# Patient Record
Sex: FEMALE | Race: WHITE | Employment: OTHER | URBAN - METROPOLITAN AREA
[De-identification: names, ages, dates, MRNs, and addresses within clinical notes are randomized per-mention and may not be internally consistent; named-entity substitution may affect disease eponyms.]

---

## 2023-04-16 ENCOUNTER — APPOINTMENT (OUTPATIENT)
Dept: CT IMAGING | Age: 68
DRG: 375 | End: 2023-04-16
Payer: MEDICARE

## 2023-04-16 ENCOUNTER — HOSPITAL ENCOUNTER (INPATIENT)
Age: 68
LOS: 1 days | Discharge: HOME | DRG: 375 | End: 2023-04-18
Attending: FAMILY MEDICINE | Admitting: FAMILY MEDICINE
Payer: MEDICARE

## 2023-04-16 DIAGNOSIS — E11.65 TYPE 2 DIABETES MELLITUS WITH HYPERGLYCEMIA, WITHOUT LONG-TERM CURRENT USE OF INSULIN (HCC): ICD-10-CM

## 2023-04-16 DIAGNOSIS — C78.00 MALIGNANT NEOPLASM METASTATIC TO LUNG, UNSPECIFIED LATERALITY (HCC): ICD-10-CM

## 2023-04-16 DIAGNOSIS — C21.8 MALIGNANT NEOPLASM OF ANORECTUM (HCC): Primary | ICD-10-CM

## 2023-04-16 PROBLEM — R73.9 HYPERGLYCEMIA: Status: ACTIVE | Noted: 2023-04-16

## 2023-04-16 LAB
ALBUMIN SERPL-MCNC: 2.7 G/DL (ref 3.5–4.6)
ALP SERPL-CCNC: 176 U/L (ref 40–130)
ALT SERPL-CCNC: 13 U/L (ref 0–33)
ANION GAP SERPL CALCULATED.3IONS-SCNC: 13 MEQ/L (ref 9–15)
AST SERPL-CCNC: 21 U/L (ref 0–35)
BASOPHILS # BLD: 0 K/UL (ref 0–0.2)
BASOPHILS NFR BLD: 0.1 %
BILIRUB SERPL-MCNC: 0.4 MG/DL (ref 0.2–0.7)
BUN SERPL-MCNC: 7 MG/DL (ref 8–23)
CALCIUM SERPL-MCNC: 8.2 MG/DL (ref 8.5–9.9)
CHLORIDE SERPL-SCNC: 90 MEQ/L (ref 95–107)
CO2 SERPL-SCNC: 24 MEQ/L (ref 20–31)
CREAT SERPL-MCNC: 0.52 MG/DL (ref 0.5–0.9)
EOSINOPHIL # BLD: 0 K/UL (ref 0–0.7)
EOSINOPHIL NFR BLD: 0.1 %
ERYTHROCYTE [DISTWIDTH] IN BLOOD BY AUTOMATED COUNT: 14.9 % (ref 11.5–14.5)
GLOBULIN SER CALC-MCNC: 3.3 G/DL (ref 2.3–3.5)
GLUCOSE BLD-MCNC: 102 MG/DL (ref 70–99)
GLUCOSE SERPL-MCNC: 625 MG/DL (ref 70–99)
HBA1C MFR BLD: 14.3 % (ref 4.8–5.9)
HCT VFR BLD AUTO: 34.3 % (ref 37–47)
HGB BLD-MCNC: 11.2 G/DL (ref 12–16)
LACTATE BLDV-SCNC: 1.9 MMOL/L (ref 0.5–2.2)
LYMPHOCYTES # BLD: 2.7 K/UL (ref 1–4.8)
LYMPHOCYTES NFR BLD: 17 %
MAGNESIUM SERPL-MCNC: 1.8 MG/DL (ref 1.7–2.4)
MCH RBC QN AUTO: 29.1 PG (ref 27–31.3)
MCHC RBC AUTO-ENTMCNC: 32.6 % (ref 33–37)
MCV RBC AUTO: 89.1 FL (ref 79.4–94.8)
MONOCYTES # BLD: 0.9 K/UL (ref 0.2–0.8)
MONOCYTES NFR BLD: 5.9 %
NEUTROPHILS # BLD: 12.1 K/UL (ref 1.4–6.5)
NEUTS SEG NFR BLD: 76.9 %
PERFORMED ON: ABNORMAL
PLATELET # BLD AUTO: 488 K/UL (ref 130–400)
POTASSIUM SERPL-SCNC: 3.2 MEQ/L (ref 3.4–4.9)
PROT SERPL-MCNC: 6 G/DL (ref 6.3–8)
RBC # BLD AUTO: 3.85 M/UL (ref 4.2–5.4)
SODIUM SERPL-SCNC: 127 MEQ/L (ref 135–144)
WBC # BLD AUTO: 15.8 K/UL (ref 4.8–10.8)

## 2023-04-16 PROCEDURE — 2580000003 HC RX 258: Performed by: FAMILY MEDICINE

## 2023-04-16 PROCEDURE — 80053 COMPREHEN METABOLIC PANEL: CPT

## 2023-04-16 PROCEDURE — 6360000004 HC RX CONTRAST MEDICATION: Performed by: FAMILY MEDICINE

## 2023-04-16 PROCEDURE — G0378 HOSPITAL OBSERVATION PER HR: HCPCS

## 2023-04-16 PROCEDURE — 96361 HYDRATE IV INFUSION ADD-ON: CPT

## 2023-04-16 PROCEDURE — 99285 EMERGENCY DEPT VISIT HI MDM: CPT

## 2023-04-16 PROCEDURE — 86304 IMMUNOASSAY TUMOR CA 125: CPT

## 2023-04-16 PROCEDURE — 83036 HEMOGLOBIN GLYCOSYLATED A1C: CPT

## 2023-04-16 PROCEDURE — 87040 BLOOD CULTURE FOR BACTERIA: CPT

## 2023-04-16 PROCEDURE — 86301 IMMUNOASSAY TUMOR CA 19-9: CPT

## 2023-04-16 PROCEDURE — 83735 ASSAY OF MAGNESIUM: CPT

## 2023-04-16 PROCEDURE — 36415 COLL VENOUS BLD VENIPUNCTURE: CPT

## 2023-04-16 PROCEDURE — 85014 HEMATOCRIT: CPT

## 2023-04-16 PROCEDURE — 83605 ASSAY OF LACTIC ACID: CPT

## 2023-04-16 PROCEDURE — 85025 COMPLETE CBC W/AUTO DIFF WBC: CPT

## 2023-04-16 PROCEDURE — 82378 CARCINOEMBRYONIC ANTIGEN: CPT

## 2023-04-16 PROCEDURE — 74177 CT ABD & PELVIS W/CONTRAST: CPT

## 2023-04-16 PROCEDURE — 85018 HEMOGLOBIN: CPT

## 2023-04-16 PROCEDURE — 6370000000 HC RX 637 (ALT 250 FOR IP): Performed by: FAMILY MEDICINE

## 2023-04-16 RX ORDER — INSULIN LISPRO 100 [IU]/ML
0-4 INJECTION, SOLUTION INTRAVENOUS; SUBCUTANEOUS
Status: DISCONTINUED | OUTPATIENT
Start: 2023-04-17 | End: 2023-04-18 | Stop reason: HOSPADM

## 2023-04-16 RX ORDER — ONDANSETRON 4 MG/1
4 TABLET, ORALLY DISINTEGRATING ORAL EVERY 8 HOURS PRN
Status: DISCONTINUED | OUTPATIENT
Start: 2023-04-16 | End: 2023-04-18 | Stop reason: HOSPADM

## 2023-04-16 RX ORDER — 0.9 % SODIUM CHLORIDE 0.9 %
1000 INTRAVENOUS SOLUTION INTRAVENOUS ONCE
Status: COMPLETED | OUTPATIENT
Start: 2023-04-16 | End: 2023-04-16

## 2023-04-16 RX ORDER — ENOXAPARIN SODIUM 100 MG/ML
30 INJECTION SUBCUTANEOUS DAILY
Status: DISCONTINUED | OUTPATIENT
Start: 2023-04-16 | End: 2023-04-16

## 2023-04-16 RX ORDER — SODIUM CHLORIDE 0.9 % (FLUSH) 0.9 %
5-40 SYRINGE (ML) INJECTION PRN
Status: DISCONTINUED | OUTPATIENT
Start: 2023-04-16 | End: 2023-04-18 | Stop reason: HOSPADM

## 2023-04-16 RX ORDER — ACETAMINOPHEN 325 MG/1
650 TABLET ORAL EVERY 6 HOURS PRN
Status: DISCONTINUED | OUTPATIENT
Start: 2023-04-16 | End: 2023-04-18 | Stop reason: HOSPADM

## 2023-04-16 RX ORDER — POTASSIUM CHLORIDE 20 MEQ/1
40 TABLET, EXTENDED RELEASE ORAL ONCE
Status: COMPLETED | OUTPATIENT
Start: 2023-04-16 | End: 2023-04-17

## 2023-04-16 RX ORDER — INSULIN LISPRO 100 [IU]/ML
0-4 INJECTION, SOLUTION INTRAVENOUS; SUBCUTANEOUS NIGHTLY
Status: DISCONTINUED | OUTPATIENT
Start: 2023-04-16 | End: 2023-04-18 | Stop reason: HOSPADM

## 2023-04-16 RX ORDER — POLYETHYLENE GLYCOL 3350 17 G/17G
17 POWDER, FOR SOLUTION ORAL DAILY PRN
Status: DISCONTINUED | OUTPATIENT
Start: 2023-04-16 | End: 2023-04-18 | Stop reason: HOSPADM

## 2023-04-16 RX ORDER — ACETAMINOPHEN 650 MG/1
650 SUPPOSITORY RECTAL EVERY 6 HOURS PRN
Status: DISCONTINUED | OUTPATIENT
Start: 2023-04-16 | End: 2023-04-18 | Stop reason: HOSPADM

## 2023-04-16 RX ORDER — SODIUM CHLORIDE 0.9 % (FLUSH) 0.9 %
5-40 SYRINGE (ML) INJECTION EVERY 12 HOURS SCHEDULED
Status: DISCONTINUED | OUTPATIENT
Start: 2023-04-16 | End: 2023-04-18 | Stop reason: HOSPADM

## 2023-04-16 RX ORDER — INSULIN LISPRO 100 [IU]/ML
5 INJECTION, SOLUTION INTRAVENOUS; SUBCUTANEOUS
Status: DISCONTINUED | OUTPATIENT
Start: 2023-04-16 | End: 2023-04-18 | Stop reason: HOSPADM

## 2023-04-16 RX ORDER — ONDANSETRON 2 MG/ML
4 INJECTION INTRAMUSCULAR; INTRAVENOUS EVERY 6 HOURS PRN
Status: DISCONTINUED | OUTPATIENT
Start: 2023-04-16 | End: 2023-04-18 | Stop reason: HOSPADM

## 2023-04-16 RX ORDER — SODIUM CHLORIDE 9 MG/ML
INJECTION, SOLUTION INTRAVENOUS PRN
Status: DISCONTINUED | OUTPATIENT
Start: 2023-04-16 | End: 2023-04-18 | Stop reason: HOSPADM

## 2023-04-16 RX ORDER — DEXTROSE MONOHYDRATE 100 MG/ML
INJECTION, SOLUTION INTRAVENOUS CONTINUOUS PRN
Status: DISCONTINUED | OUTPATIENT
Start: 2023-04-16 | End: 2023-04-18 | Stop reason: HOSPADM

## 2023-04-16 RX ORDER — SODIUM CHLORIDE AND POTASSIUM CHLORIDE 300; 900 MG/100ML; MG/100ML
INJECTION, SOLUTION INTRAVENOUS CONTINUOUS
Status: DISCONTINUED | OUTPATIENT
Start: 2023-04-16 | End: 2023-04-18 | Stop reason: HOSPADM

## 2023-04-16 RX ORDER — INSULIN GLARGINE 100 [IU]/ML
0.25 INJECTION, SOLUTION SUBCUTANEOUS NIGHTLY
Status: DISCONTINUED | OUTPATIENT
Start: 2023-04-16 | End: 2023-04-18 | Stop reason: HOSPADM

## 2023-04-16 RX ADMIN — SODIUM CHLORIDE 1000 ML: 9 INJECTION, SOLUTION INTRAVENOUS at 18:57

## 2023-04-16 RX ADMIN — SODIUM CHLORIDE 1000 ML: 9 INJECTION, SOLUTION INTRAVENOUS at 16:18

## 2023-04-16 RX ADMIN — IOPAMIDOL 50 ML: 612 INJECTION, SOLUTION INTRAVENOUS at 16:50

## 2023-04-16 RX ADMIN — INSULIN HUMAN 8 UNITS: 100 INJECTION, SOLUTION PARENTERAL at 18:55

## 2023-04-16 ASSESSMENT — ENCOUNTER SYMPTOMS
RESPIRATORY NEGATIVE: 1
ANAL BLEEDING: 1

## 2023-04-16 ASSESSMENT — LIFESTYLE VARIABLES
HOW MANY STANDARD DRINKS CONTAINING ALCOHOL DO YOU HAVE ON A TYPICAL DAY: PATIENT DOES NOT DRINK
HOW OFTEN DO YOU HAVE A DRINK CONTAINING ALCOHOL: NEVER

## 2023-04-16 ASSESSMENT — PAIN DESCRIPTION - PAIN TYPE: TYPE: ACUTE PAIN

## 2023-04-16 ASSESSMENT — PAIN DESCRIPTION - LOCATION: LOCATION: RECTUM

## 2023-04-16 ASSESSMENT — PAIN - FUNCTIONAL ASSESSMENT: PAIN_FUNCTIONAL_ASSESSMENT: 0-10

## 2023-04-16 ASSESSMENT — PAIN DESCRIPTION - DESCRIPTORS: DESCRIPTORS: PRESSURE

## 2023-04-16 ASSESSMENT — PAIN SCALES - GENERAL: PAINLEVEL_OUTOF10: 3

## 2023-04-17 ENCOUNTER — ANESTHESIA EVENT (OUTPATIENT)
Dept: OPERATING ROOM | Age: 68
DRG: 375 | End: 2023-04-17
Payer: MEDICARE

## 2023-04-17 ENCOUNTER — ANESTHESIA (OUTPATIENT)
Dept: OPERATING ROOM | Age: 68
DRG: 375 | End: 2023-04-17
Payer: MEDICARE

## 2023-04-17 PROBLEM — Z71.89 ENCOUNTER FOR HOSPICE CARE DISCUSSION: Status: ACTIVE | Noted: 2023-04-17

## 2023-04-17 PROBLEM — Z71.89 GOALS OF CARE, COUNSELING/DISCUSSION: Status: ACTIVE | Noted: 2023-04-17

## 2023-04-17 PROBLEM — Z71.89 ADVANCED CARE PLANNING/COUNSELING DISCUSSION: Status: ACTIVE | Noted: 2023-04-17

## 2023-04-17 PROBLEM — C18.9 COLON CANCER METASTASIZED TO MULTIPLE SITES (HCC): Status: ACTIVE | Noted: 2023-04-17

## 2023-04-17 PROBLEM — C21.8 MALIGNANT NEOPLASM OF ANORECTUM (HCC): Status: ACTIVE | Noted: 2023-04-17

## 2023-04-17 PROBLEM — Z51.5 PALLIATIVE CARE ENCOUNTER: Status: ACTIVE | Noted: 2023-04-17

## 2023-04-17 LAB
ALBUMIN SERPL-MCNC: 2.5 G/DL (ref 3.5–4.6)
ALP SERPL-CCNC: 154 U/L (ref 40–130)
ALT SERPL-CCNC: 12 U/L (ref 0–33)
ANION GAP SERPL CALCULATED.3IONS-SCNC: 11 MEQ/L (ref 9–15)
ANION GAP SERPL CALCULATED.3IONS-SCNC: 14 MEQ/L (ref 9–15)
APTT PPP: 32.3 SEC (ref 24.4–36.8)
AST SERPL-CCNC: 23 U/L (ref 0–35)
BASOPHILS # BLD: 0 K/UL (ref 0–0.2)
BASOPHILS NFR BLD: 0.2 %
BILIRUB DIRECT SERPL-MCNC: <0.2 MG/DL (ref 0–0.4)
BILIRUB INDIRECT SERPL-MCNC: ABNORMAL MG/DL (ref 0–0.6)
BILIRUB SERPL-MCNC: 0.4 MG/DL (ref 0.2–0.7)
BUN SERPL-MCNC: 4 MG/DL (ref 8–23)
BUN SERPL-MCNC: 4 MG/DL (ref 8–23)
CA 125: 45 U/ML
CALCIUM SERPL-MCNC: 7.9 MG/DL (ref 8.5–9.9)
CALCIUM SERPL-MCNC: 8.3 MG/DL (ref 8.5–9.9)
CANCER AG19-9 SERPL IA-ACNC: 528 U/ML (ref 0–35)
CARCINOEMBRYONIC ANTIGEN: 267.4 NG/ML
CHLORIDE SERPL-SCNC: 99 MEQ/L (ref 95–107)
CHLORIDE SERPL-SCNC: 99 MEQ/L (ref 95–107)
CO2 SERPL-SCNC: 21 MEQ/L (ref 20–31)
CO2 SERPL-SCNC: 22 MEQ/L (ref 20–31)
CREAT SERPL-MCNC: 0.37 MG/DL (ref 0.5–0.9)
CREAT SERPL-MCNC: 0.42 MG/DL (ref 0.5–0.9)
EKG ATRIAL RATE: 89 BPM
EKG P AXIS: 56 DEGREES
EKG P-R INTERVAL: 126 MS
EKG Q-T INTERVAL: 406 MS
EKG QRS DURATION: 58 MS
EKG QTC CALCULATION (BAZETT): 493 MS
EKG R AXIS: 49 DEGREES
EKG T AXIS: 38 DEGREES
EKG VENTRICULAR RATE: 89 BPM
EOSINOPHIL # BLD: 0 K/UL (ref 0–0.7)
EOSINOPHIL NFR BLD: 0.2 %
ERYTHROCYTE [DISTWIDTH] IN BLOOD BY AUTOMATED COUNT: 14.6 % (ref 11.5–14.5)
GLUCOSE BLD-MCNC: 190 MG/DL (ref 70–99)
GLUCOSE BLD-MCNC: 230 MG/DL (ref 70–99)
GLUCOSE BLD-MCNC: 243 MG/DL (ref 70–99)
GLUCOSE BLD-MCNC: 250 MG/DL (ref 70–99)
GLUCOSE BLD-MCNC: 419 MG/DL (ref 70–99)
GLUCOSE SERPL-MCNC: 110 MG/DL (ref 70–99)
GLUCOSE SERPL-MCNC: 189 MG/DL (ref 70–99)
HCT VFR BLD AUTO: 30.6 % (ref 37–47)
HCT VFR BLD AUTO: 31.5 % (ref 37–47)
HCT VFR BLD AUTO: 34 % (ref 37–47)
HCT VFR BLD AUTO: 34 % (ref 37–47)
HGB BLD-MCNC: 10.3 G/DL (ref 12–16)
HGB BLD-MCNC: 10.3 G/DL (ref 12–16)
HGB BLD-MCNC: 11 G/DL (ref 12–16)
HGB BLD-MCNC: 11.2 G/DL (ref 12–16)
INR PPP: 1.1
LYMPHOCYTES # BLD: 3.4 K/UL (ref 1–4.8)
LYMPHOCYTES NFR BLD: 19 %
MCH RBC QN AUTO: 29.4 PG (ref 27–31.3)
MCHC RBC AUTO-ENTMCNC: 33 % (ref 33–37)
MCV RBC AUTO: 89.3 FL (ref 79.4–94.8)
MONOCYTES # BLD: 1.1 K/UL (ref 0.2–0.8)
MONOCYTES NFR BLD: 5.9 %
NEUTROPHILS # BLD: 13.3 K/UL (ref 1.4–6.5)
NEUTS SEG NFR BLD: 74.7 %
PERFORMED ON: ABNORMAL
PLATELET # BLD AUTO: 484 K/UL (ref 130–400)
POC CREATININE: 0.4 MG/DL (ref 0.6–1.2)
POC SAMPLE TYPE: ABNORMAL
POTASSIUM SERPL-SCNC: 2.6 MEQ/L (ref 3.4–4.9)
POTASSIUM SERPL-SCNC: 3.8 MEQ/L (ref 3.4–4.9)
PROT SERPL-MCNC: 5.9 G/DL (ref 6.3–8)
PROTHROMBIN TIME: 14.4 SEC (ref 12.3–14.9)
RBC # BLD AUTO: 3.81 M/UL (ref 4.2–5.4)
SODIUM SERPL-SCNC: 132 MEQ/L (ref 135–144)
SODIUM SERPL-SCNC: 134 MEQ/L (ref 135–144)
WBC # BLD AUTO: 17.8 K/UL (ref 4.8–10.8)

## 2023-04-17 PROCEDURE — 94664 DEMO&/EVAL PT USE INHALER: CPT

## 2023-04-17 PROCEDURE — 85018 HEMOGLOBIN: CPT

## 2023-04-17 PROCEDURE — 2709999900 HC NON-CHARGEABLE SUPPLY: Performed by: COLON & RECTAL SURGERY

## 2023-04-17 PROCEDURE — 99223 1ST HOSP IP/OBS HIGH 75: CPT | Performed by: NURSE PRACTITIONER

## 2023-04-17 PROCEDURE — 94640 AIRWAY INHALATION TREATMENT: CPT

## 2023-04-17 PROCEDURE — 6360000002 HC RX W HCPCS: Performed by: INTERNAL MEDICINE

## 2023-04-17 PROCEDURE — 36415 COLL VENOUS BLD VENIPUNCTURE: CPT

## 2023-04-17 PROCEDURE — 80076 HEPATIC FUNCTION PANEL: CPT

## 2023-04-17 PROCEDURE — 7100000001 HC PACU RECOVERY - ADDTL 15 MIN: Performed by: COLON & RECTAL SURGERY

## 2023-04-17 PROCEDURE — 96365 THER/PROPH/DIAG IV INF INIT: CPT

## 2023-04-17 PROCEDURE — 1210000000 HC MED SURG R&B

## 2023-04-17 PROCEDURE — 80048 BASIC METABOLIC PNL TOTAL CA: CPT

## 2023-04-17 PROCEDURE — 2580000003 HC RX 258: Performed by: COLON & RECTAL SURGERY

## 2023-04-17 PROCEDURE — 7100000000 HC PACU RECOVERY - FIRST 15 MIN: Performed by: COLON & RECTAL SURGERY

## 2023-04-17 PROCEDURE — 88342 IMHCHEM/IMCYTCHM 1ST ANTB: CPT

## 2023-04-17 PROCEDURE — 6370000000 HC RX 637 (ALT 250 FOR IP): Performed by: COLON & RECTAL SURGERY

## 2023-04-17 PROCEDURE — 6370000000 HC RX 637 (ALT 250 FOR IP): Performed by: INTERNAL MEDICINE

## 2023-04-17 PROCEDURE — 99221 1ST HOSP IP/OBS SF/LOW 40: CPT | Performed by: COLON & RECTAL SURGERY

## 2023-04-17 PROCEDURE — 3700000000 HC ANESTHESIA ATTENDED CARE: Performed by: COLON & RECTAL SURGERY

## 2023-04-17 PROCEDURE — 88305 TISSUE EXAM BY PATHOLOGIST: CPT

## 2023-04-17 PROCEDURE — 3700000001 HC ADD 15 MINUTES (ANESTHESIA): Performed by: COLON & RECTAL SURGERY

## 2023-04-17 PROCEDURE — G0378 HOSPITAL OBSERVATION PER HR: HCPCS

## 2023-04-17 PROCEDURE — 85025 COMPLETE CBC W/AUTO DIFF WBC: CPT

## 2023-04-17 PROCEDURE — 85610 PROTHROMBIN TIME: CPT

## 2023-04-17 PROCEDURE — 85014 HEMATOCRIT: CPT

## 2023-04-17 PROCEDURE — 88341 IMHCHEM/IMCYTCHM EA ADD ANTB: CPT

## 2023-04-17 PROCEDURE — 94761 N-INVAS EAR/PLS OXIMETRY MLT: CPT

## 2023-04-17 PROCEDURE — 96366 THER/PROPH/DIAG IV INF ADDON: CPT

## 2023-04-17 PROCEDURE — 88331 PATH CONSLTJ SURG 1 BLK 1SPC: CPT

## 2023-04-17 PROCEDURE — 45338 SIGMOIDOSCOPY W/TUMR REMOVE: CPT | Performed by: COLON & RECTAL SURGERY

## 2023-04-17 PROCEDURE — 85730 THROMBOPLASTIN TIME PARTIAL: CPT

## 2023-04-17 PROCEDURE — 93005 ELECTROCARDIOGRAM TRACING: CPT | Performed by: INTERNAL MEDICINE

## 2023-04-17 PROCEDURE — 0DBP8ZX EXCISION OF RECTUM, VIA NATURAL OR ARTIFICIAL OPENING ENDOSCOPIC, DIAGNOSTIC: ICD-10-PCS | Performed by: COLON & RECTAL SURGERY

## 2023-04-17 PROCEDURE — 3609008400 HC SIGMOIDOSCOPY DIAGNOSTIC: Performed by: COLON & RECTAL SURGERY

## 2023-04-17 RX ORDER — MEPERIDINE HYDROCHLORIDE 25 MG/ML
12.5 INJECTION INTRAMUSCULAR; INTRAVENOUS; SUBCUTANEOUS
Status: DISCONTINUED | OUTPATIENT
Start: 2023-04-17 | End: 2023-04-17 | Stop reason: HOSPADM

## 2023-04-17 RX ORDER — ONDANSETRON 2 MG/ML
4 INJECTION INTRAMUSCULAR; INTRAVENOUS
Status: DISCONTINUED | OUTPATIENT
Start: 2023-04-17 | End: 2023-04-17 | Stop reason: HOSPADM

## 2023-04-17 RX ORDER — SODIUM CHLORIDE 0.9 % (FLUSH) 0.9 %
5-40 SYRINGE (ML) INJECTION PRN
Status: DISCONTINUED | OUTPATIENT
Start: 2023-04-17 | End: 2023-04-17 | Stop reason: HOSPADM

## 2023-04-17 RX ORDER — METOCLOPRAMIDE HYDROCHLORIDE 5 MG/ML
10 INJECTION INTRAMUSCULAR; INTRAVENOUS
Status: DISCONTINUED | OUTPATIENT
Start: 2023-04-17 | End: 2023-04-17 | Stop reason: HOSPADM

## 2023-04-17 RX ORDER — SODIUM CHLORIDE 9 MG/ML
25 INJECTION, SOLUTION INTRAVENOUS PRN
Status: DISCONTINUED | OUTPATIENT
Start: 2023-04-17 | End: 2023-04-17 | Stop reason: HOSPADM

## 2023-04-17 RX ORDER — SODIUM CHLORIDE, SODIUM LACTATE, POTASSIUM CHLORIDE, CALCIUM CHLORIDE 600; 310; 30; 20 MG/100ML; MG/100ML; MG/100ML; MG/100ML
INJECTION, SOLUTION INTRAVENOUS CONTINUOUS
Status: DISCONTINUED | OUTPATIENT
Start: 2023-04-17 | End: 2023-04-17 | Stop reason: HOSPADM

## 2023-04-17 RX ORDER — DIPHENHYDRAMINE HYDROCHLORIDE 50 MG/ML
12.5 INJECTION INTRAMUSCULAR; INTRAVENOUS
Status: DISCONTINUED | OUTPATIENT
Start: 2023-04-17 | End: 2023-04-17 | Stop reason: HOSPADM

## 2023-04-17 RX ORDER — PROPOFOL 10 MG/ML
INJECTION, EMULSION INTRAVENOUS CONTINUOUS PRN
Status: DISCONTINUED | OUTPATIENT
Start: 2023-04-17 | End: 2023-04-17 | Stop reason: SDUPTHER

## 2023-04-17 RX ORDER — FENTANYL CITRATE 0.05 MG/ML
50 INJECTION, SOLUTION INTRAMUSCULAR; INTRAVENOUS EVERY 10 MIN PRN
Status: DISCONTINUED | OUTPATIENT
Start: 2023-04-17 | End: 2023-04-17 | Stop reason: HOSPADM

## 2023-04-17 RX ORDER — ALBUTEROL SULFATE 2.5 MG/3ML
2.5 SOLUTION RESPIRATORY (INHALATION) EVERY 4 HOURS PRN
Status: DISCONTINUED | OUTPATIENT
Start: 2023-04-17 | End: 2023-04-18 | Stop reason: HOSPADM

## 2023-04-17 RX ORDER — SODIUM CHLORIDE 0.9 % (FLUSH) 0.9 %
5-40 SYRINGE (ML) INJECTION EVERY 12 HOURS SCHEDULED
Status: DISCONTINUED | OUTPATIENT
Start: 2023-04-17 | End: 2023-04-17 | Stop reason: HOSPADM

## 2023-04-17 RX ORDER — OXYCODONE HYDROCHLORIDE 5 MG/1
5 TABLET ORAL
Status: DISCONTINUED | OUTPATIENT
Start: 2023-04-17 | End: 2023-04-17 | Stop reason: HOSPADM

## 2023-04-17 RX ADMIN — INSULIN HUMAN 4 UNITS: 100 INJECTION, SOLUTION PARENTERAL at 23:40

## 2023-04-17 RX ADMIN — ALBUTEROL SULFATE 2.5 MG: 2.5 SOLUTION RESPIRATORY (INHALATION) at 06:05

## 2023-04-17 RX ADMIN — Medication 10 ML: at 23:24

## 2023-04-17 RX ADMIN — SODIUM CHLORIDE, POTASSIUM CHLORIDE, SODIUM LACTATE AND CALCIUM CHLORIDE: 600; 310; 30; 20 INJECTION, SOLUTION INTRAVENOUS at 09:11

## 2023-04-17 RX ADMIN — POTASSIUM CHLORIDE AND SODIUM CHLORIDE: 900; 300 INJECTION, SOLUTION INTRAVENOUS at 01:12

## 2023-04-17 RX ADMIN — POTASSIUM CHLORIDE 40 MEQ: 1500 TABLET, EXTENDED RELEASE ORAL at 01:06

## 2023-04-17 RX ADMIN — PROPOFOL 120 MCG/KG/MIN: 10 INJECTION, EMULSION INTRAVENOUS at 10:17

## 2023-04-17 RX ADMIN — INSULIN LISPRO 4 UNITS: 100 INJECTION, SOLUTION INTRAVENOUS; SUBCUTANEOUS at 21:24

## 2023-04-17 ASSESSMENT — ENCOUNTER SYMPTOMS
CONSTIPATION: 0
SHORTNESS OF BREATH: 0
NAUSEA: 0
COUGH: 1
DIARRHEA: 0
ABDOMINAL PAIN: 0
SINUS PAIN: 0
SINUS PRESSURE: 0
BLOOD IN STOOL: 1
BACK PAIN: 0
TROUBLE SWALLOWING: 0

## 2023-04-17 ASSESSMENT — PAIN SCALES - GENERAL
PAINLEVEL_OUTOF10: 0

## 2023-04-17 ASSESSMENT — PAIN DESCRIPTION - LOCATION
LOCATION: RECTUM

## 2023-04-17 ASSESSMENT — PAIN DESCRIPTION - PAIN TYPE
TYPE: SURGICAL PAIN

## 2023-04-17 ASSESSMENT — PAIN DESCRIPTION - DESCRIPTORS
DESCRIPTORS: PRESSURE

## 2023-04-17 NOTE — ANESTHESIA POSTPROCEDURE EVALUATION
Department of Anesthesiology  Postprocedure Note    Patient: Wendy Coleman  MRN: [id number]  YOB: 1955  Date of evaluation: 4/17/2023      Procedure Summary     Date: 04/17/23 Room / Location: [address]    Anesthesia Start: 1015 Anesthesia Stop:     Procedure: Otila Dace (Rectum) Diagnosis:       Malignant neoplasm of anorectum ([address]      (Malignant neoplasm of anorectum ([address] [C21.8])    Surgeons: Kristin Underwood MD Responsible Provider: Seda Huddleston MD    Anesthesia Type: MAC ASA Status: 3          Anesthesia Type: No value filed.     Mu Phase I:      Mu Phase II:        Anesthesia Post Evaluation    Patient location during evaluation: PACU  Patient participation: complete - patient participated  Level of consciousness: awake  Pain score: 0  Airway patency: patent  Nausea & Vomiting: no nausea  Complications: no  Cardiovascular status: hemodynamically stable  Respiratory status: face mask  Hydration status: euvolemic

## 2023-04-17 NOTE — H&P
Hospital Medicine  History and Physical    Patient:  Pratima Campos  MRN: [id number]    CHIEF COMPLAINT:    Chief Complaint   Patient presents with    Rectal Bleeding     Off and on for months, started again this morning       History Obtained From:  patient, electronic medical record  Primary Care Physician: Natalio Georges DO    HISTORY OF PRESENT ILLNESS:   The patient is a 75 y.o. female who presents with bright red blood per rectum. Patient is a 68-year-old female who noted some blood with bowel movements for the past several months. She came to the hospital for evaluation was found to have a large rectal mass. 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 Patient also has a fluid density in the pancreatic tail her body likely representing IPMN. Patient admits to occasional fevers no chest pain or palpitation she has an occasional cough nonproductive she has poor appetite and has had a significant amount of weight loss recently. She had no previous history of diabetes but presents with a glucose of 625 Chloride are 127 3.2 respectivelybut creatinine noted to be 0.5. Patient has been up-to-date on all her cancer screenings and has had no abnormalities she has had no abnormal Pap smears colonoscopies EGD or mammograms    Past Medical History:      Diagnosis Date    Asthma        Past Surgical History:      Procedure Laterality Date     SECTION      DILATION AND CURETTAGE OF UTERUS         Medications Prior to Admission:    Prior to Admission medications    Medication Sig Start Date End Date Taking? Authorizing Provider   ALBUTEROL IN Inhale 2 puffs into the lungs as needed   Yes Historical Provider, MD       Allergies:  Sulfa antibiotics    Social History:   TOBACCO:   reports that she has never smoked.  She has never used

## 2023-04-17 NOTE — ANESTHESIA PRE PROCEDURE
Department of Anesthesiology  Preprocedure Note       Name:  Shawn Nichols   Age:  75 y.o.  :  1955                                          MRN:  [id number]         Date:  2023      Surgeon: Mary Aly):  Stefanie Llanes MD    Procedure: Procedure(s): FLEXIBLE SIGMOIDOSCOPY ROOM 183    Medications prior to admission:   Prior to Admission medications    Medication Sig Start Date End Date Taking?  Authorizing Provider   ALBUTEROL IN Inhale 2 puffs into the lungs as needed   Yes Historical Provider, MD       Current medications:    Current Facility-Administered Medications   Medication Dose Route Frequency Provider Last Rate Last Admin    albuterol (PROVENTIL) nebulizer solution 2.5 mg  2.5 mg Nebulization Q4H PRN Raynold Wellington Sedar, DO   2.5 mg at 23 0605    sodium chloride flush 0.9 % injection 5-40 mL  5-40 mL IntraVENous 2 times per day Raynold Maribel Sedar, DO        sodium chloride flush 0.9 % injection 5-40 mL  5-40 mL IntraVENous PRN Raynold Wellington Sedar, DO        0.9 % sodium chloride infusion   IntraVENous PRN Raynold Maribel Sedar, DO        ondansetron (ZOFRAN-ODT) disintegrating tablet 4 mg  4 mg Oral Q8H PRN Raynold Maribel Sedar, DO        Or    ondansetron (ZOFRAN) injection 4 mg  4 mg IntraVENous Q6H PRN Raynold Wellington Sedar, DO        polyethylene glycol (GLYCOLAX) packet 17 g  17 g Oral Daily PRN Raynold Maribel Sedar, DO        acetaminophen (TYLENOL) tablet 650 mg  650 mg Oral Q6H PRN Raynold Wellington Sedar, DO        Or    acetaminophen (TYLENOL) suppository 650 mg  650 mg Rectal Q6H PRN Raynold Wellington Sedar, DO        glucose chewable tablet 16 g  4 tablet Oral PRN Raynold Maribel Sedar, DO        dextrose bolus 10% 125 mL  125 mL IntraVENous PRN Raynold Wellington Sedar, DO        Or    dextrose bolus 10% 250 mL  250 mL IntraVENous PRN Raynold Maribel Sedar, DO        glucagon (rDNA) injection 1 mg  1 mg SubCUTAneous PRN Raynold Maribel Sedar, DO        dextrose 10 % infusion   IntraVENous Continuous PRN Raynold Wellington Sedar, DO        [Held by provider] insulin glargine

## 2023-04-17 NOTE — FLOWSHEET NOTE
Upon arrival to shift, surgery called. Patient to go for flexible sigmoidoscopy for biopsy. Patient agreed. Patient left by 0830. Patient back to unit by 1200. Patient tolerated well. Dry dressing on biopsy site on external buttock. Pall care met with patient. Patient needs more time. Dr Jesusita Meneses spoke to patient. Dr Tate Ruiz will talk to patient tomorrow about possible surgical options. Family visited  Patient having mucous like bloody stool output. Consisent with like petroleum jelly mixed with vangie blood and stool. Small amount. Changed dry dressing on external mass. Dr Escalona  rounded. Reports to nurse. Confirmed mass is cancer. States no surgical options at this time.

## 2023-04-18 VITALS
BODY MASS INDEX: 18.35 KG/M2 | SYSTOLIC BLOOD PRESSURE: 137 MMHG | RESPIRATION RATE: 18 BRPM | TEMPERATURE: 97.3 F | HEIGHT: 59 IN | WEIGHT: 91 LBS | OXYGEN SATURATION: 98 % | DIASTOLIC BLOOD PRESSURE: 70 MMHG | HEART RATE: 96 BPM

## 2023-04-18 LAB
ALBUMIN SERPL-MCNC: 2.4 G/DL (ref 3.5–4.6)
ALP SERPL-CCNC: 134 U/L (ref 40–130)
ALT SERPL-CCNC: 14 U/L (ref 0–33)
ANION GAP SERPL CALCULATED.3IONS-SCNC: 10 MEQ/L (ref 9–15)
AST SERPL-CCNC: 24 U/L (ref 0–35)
BASOPHILS # BLD: 0 K/UL (ref 0–0.2)
BASOPHILS NFR BLD: 0.3 %
BILIRUB DIRECT SERPL-MCNC: <0.2 MG/DL (ref 0–0.4)
BILIRUB INDIRECT SERPL-MCNC: ABNORMAL MG/DL (ref 0–0.6)
BILIRUB SERPL-MCNC: 0.4 MG/DL (ref 0.2–0.7)
BUN SERPL-MCNC: 5 MG/DL (ref 8–23)
CALCIUM SERPL-MCNC: 8.2 MG/DL (ref 8.5–9.9)
CHLORIDE SERPL-SCNC: 100 MEQ/L (ref 95–107)
CO2 SERPL-SCNC: 26 MEQ/L (ref 20–31)
CREAT SERPL-MCNC: 0.41 MG/DL (ref 0.5–0.9)
EOSINOPHIL # BLD: 0.1 K/UL (ref 0–0.7)
EOSINOPHIL NFR BLD: 0.5 %
ERYTHROCYTE [DISTWIDTH] IN BLOOD BY AUTOMATED COUNT: 14.7 % (ref 11.5–14.5)
GLUCOSE BLD-MCNC: 186 MG/DL (ref 70–99)
GLUCOSE BLD-MCNC: 307 MG/DL (ref 70–99)
GLUCOSE BLD-MCNC: 441 MG/DL (ref 70–99)
GLUCOSE SERPL-MCNC: 166 MG/DL (ref 70–99)
HCT VFR BLD AUTO: 31.3 % (ref 37–47)
HCT VFR BLD AUTO: 31.6 % (ref 37–47)
HCT VFR BLD AUTO: 31.9 % (ref 37–47)
HCT VFR BLD AUTO: 34.1 % (ref 37–47)
HCT VFR BLD AUTO: 37.2 % (ref 37–47)
HGB BLD-MCNC: 10.4 G/DL (ref 12–16)
HGB BLD-MCNC: 10.5 G/DL (ref 12–16)
HGB BLD-MCNC: 10.5 G/DL (ref 12–16)
HGB BLD-MCNC: 11.3 G/DL (ref 12–16)
HGB BLD-MCNC: 12 G/DL (ref 12–16)
LYMPHOCYTES # BLD: 3.3 K/UL (ref 1–4.8)
LYMPHOCYTES NFR BLD: 20.7 %
MAGNESIUM SERPL-MCNC: 1.7 MG/DL (ref 1.7–2.4)
MCH RBC QN AUTO: 29.4 PG (ref 27–31.3)
MCHC RBC AUTO-ENTMCNC: 33.6 % (ref 33–37)
MCV RBC AUTO: 87.5 FL (ref 79.4–94.8)
MONOCYTES # BLD: 1 K/UL (ref 0.2–0.8)
MONOCYTES NFR BLD: 6.4 %
NEUTROPHILS # BLD: 11.5 K/UL (ref 1.4–6.5)
NEUTS SEG NFR BLD: 72.1 %
PERFORMED ON: ABNORMAL
PLATELET # BLD AUTO: 476 K/UL (ref 130–400)
POTASSIUM SERPL-SCNC: 3.5 MEQ/L (ref 3.4–4.9)
PROT SERPL-MCNC: 5.1 G/DL (ref 6.3–8)
RBC # BLD AUTO: 3.57 M/UL (ref 4.2–5.4)
SODIUM SERPL-SCNC: 136 MEQ/L (ref 135–144)
WBC # BLD AUTO: 16 K/UL (ref 4.8–10.8)

## 2023-04-18 PROCEDURE — 6370000000 HC RX 637 (ALT 250 FOR IP): Performed by: COLON & RECTAL SURGERY

## 2023-04-18 PROCEDURE — 85025 COMPLETE CBC W/AUTO DIFF WBC: CPT

## 2023-04-18 PROCEDURE — 80048 BASIC METABOLIC PNL TOTAL CA: CPT

## 2023-04-18 PROCEDURE — 80076 HEPATIC FUNCTION PANEL: CPT

## 2023-04-18 PROCEDURE — 36415 COLL VENOUS BLD VENIPUNCTURE: CPT

## 2023-04-18 PROCEDURE — 82105 ALPHA-FETOPROTEIN SERUM: CPT

## 2023-04-18 PROCEDURE — 99233 SBSQ HOSP IP/OBS HIGH 50: CPT | Performed by: NURSE PRACTITIONER

## 2023-04-18 PROCEDURE — 85014 HEMATOCRIT: CPT

## 2023-04-18 PROCEDURE — 85018 HEMOGLOBIN: CPT

## 2023-04-18 PROCEDURE — 83735 ASSAY OF MAGNESIUM: CPT

## 2023-04-18 RX ADMIN — INSULIN LISPRO 4 UNITS: 100 INJECTION, SOLUTION INTRAVENOUS; SUBCUTANEOUS at 11:34

## 2023-04-18 ASSESSMENT — PAIN DESCRIPTION - PAIN TYPE
TYPE: ACUTE PAIN;CHRONIC PAIN

## 2023-04-18 ASSESSMENT — PAIN DESCRIPTION - LOCATION
LOCATION: RECTUM

## 2023-04-18 ASSESSMENT — ENCOUNTER SYMPTOMS
SINUS PAIN: 0
TROUBLE SWALLOWING: 0
DIARRHEA: 0
SHORTNESS OF BREATH: 0
COUGH: 1
NAUSEA: 0
ABDOMINAL PAIN: 0
BACK PAIN: 0
CONSTIPATION: 0
SINUS PRESSURE: 0
BLOOD IN STOOL: 1
RECTAL PAIN: 1

## 2023-04-18 ASSESSMENT — PAIN DESCRIPTION - DESCRIPTORS
DESCRIPTORS: PRESSURE

## 2023-04-18 ASSESSMENT — PAIN SCALES - GENERAL
PAINLEVEL_OUTOF10: 3
PAINLEVEL_OUTOF10: 3
PAINLEVEL_OUTOF10: 4
PAINLEVEL_OUTOF10: 3
PAINLEVEL_OUTOF10: 4

## 2023-04-18 NOTE — CARE COORDINATION
AWAITING HEM/ONC CONSULT. DC PLAN HOME ONCE CLEARED BY DRS.
Advance Care Planning   Healthcare Decision Maker:      Click here to complete Healthcare Decision Makers including selection of the Healthcare Decision Maker Relationship (ie \"Primary\"). Today we documented Decision Maker(s) consistent with Legal Next of Kin hierarchy.        If the relationship to the patient does NOT follow our state's Next of Kin hierarchy, the patient MUST complete an ACP Document to allow him/her to act on the patient's behalf. :
MET WITH PATIENT, NOTIFIED OF CHANGE TO INPATIENT. IMM EXPLAINED, PT VERBALLY ACKNOWLEDGED, COPY GIVEN.
RETURNING to current housing: YES  Potential Assistance needed at discharge:              Potential DME:    Patient expects to discharge to:    Plan for transportation at discharge:      Financial    Payor: [facility] / Plan: Zach Novak PPO / Product Type: Medicare /     Does insurance require precert for SNF: Yes    Potential assistance Purchasing Medications:    Meds-to-Beds request:        [id number] [address] [phone] Cheyanne Sink [phone]  [place] [address]  Phone: [phone] Fax: [phone]      Notes:    Factors facilitating achievement of predicted outcomes: Family support    Barriers to discharge: Medical complications    Additional Case Management Notes: MET WITH PT AT BEDSIDE TO [address] [place] [address]. PT LIVES AT HOME WITH HER SPOUSE AND SON AND PLANS TO RETURN THERE ON DISCHARGE. PT DENIES DC NEEDS. PT IS INDEPENDENT AND DOES NOT USE ANY DME OR RESPIRATORY EQUIPMENT. REVIEWED MEDICARE OUTPATIENT OBSERVATION NOTICE WITH PT AND PT GAVE VERBAL UNDERSTANDING AND COPY PROVIDED TO PT. The Plan for Transition of Care is related to the following treatment goals of Malignant neoplasm of anorectum ([facility] [C21.8]  Hyperglycemia [R73.9]  Malignant neoplasm metastatic to lung, unspecified laterality ([address] [C78.00]  Type 2 diabetes mellitus with hyperglycemia, without long-term current use of insulin ([address] [Z18.39]    IF APPLICABLE: The Patient and/or patient representative Hanna Disla and her family were provided with a choice of provider and agrees with the discharge plan. Freedom of choice list with basic dialogue that supports the patient's individualized plan of care/goals and shares the quality data associated with the providers was provided to:     Patient Representative Name:       The Patient and/or Patient Representative Agree with the Discharge Plan?       Emmanuel Sandoval RN  Case Management Department

## 2023-04-18 NOTE — DISCHARGE INSTR - DIET

## 2023-04-18 NOTE — DISCHARGE SUMMARY
medications    Details   metFORMIN (GLUCOPHAGE) 500 MG tablet Take 1 tablet by mouth daily (with breakfast)  Qty: 30 tablet, Refills: 0           CONTINUE these medications which have NOT CHANGED    Details   ALBUTEROL IN Inhale 2 puffs into the lungs as needed           Activity: activity as tolerated  Diet: regular diet  Wound Care: none needed    Follow-up with PCP 1- 2 weeks, oncology 1-2 weeks, general surgery as needed.     DC time 35 minutes    Signed:  Electronically signed by Bhavna Carrion MD on 4/18/2023 at 4:54 PM

## 2023-04-18 NOTE — PROGRESS NOTES
1745 PM Patient given discharge instructions. Verbalized understanding of medications times and follow up care. Patient aware of follow up with  with Dr. Bony Bajwa, aware will be addressing plan of care. Patient without any questions or concerns Deleted NCP. Stable at time of discharge. 1800 PM Updated patient regarding follow up at [facility] and calling for appointment  [phone] and make appointment with Dr. Janis Bajwa in 1 week; verbalized understanding.  Will call in AM. Aware can leave after lab work drawn as per orders pr Dr. Delroy Elliott. Patient waiting for  to arrive to take home
Hospitalist Daily Progress Note  Name: Lizbeth Mcclellan  Age: 75 y.o. Gender: female  CodeStatus: Full Code  Allergies: Sulfa Antibiotics    Chief Complaint:Rectal Bleeding (Off and on for months, started again this morning)      Primary Care Provider: Jr Frankel DO    InpatientTreatment Team: Treatment Team: Attending Provider: Deepti Hamilton MD; Consulting Physician: Amarilis Salas MD; Consulting Physician: Karen Torrez MD; Nursing Student: Carrie Vasquez; Surgeon: Karen Torrez MD; Registered Nurse: Mike Villegas RN    Admission Date: 4/16/2023      Subjective: No chest pain, sob, nausea. Resting in bed. Physical Exam  Vitals and nursing note reviewed. Constitutional:       Appearance: Normal appearance. Cardiovascular:      Rate and Rhythm: Normal rate and regular rhythm. Pulmonary:      Effort: Pulmonary effort is normal.      Breath sounds: Normal breath sounds. Abdominal:      General: Bowel sounds are normal.      Palpations: Abdomen is soft. Musculoskeletal:         General: Normal range of motion. Skin:     General: Skin is warm and dry. Neurological:      Mental Status: She is alert and oriented to person, place, and time. Mental status is at baseline.        Medications:  Reviewed    Infusion Medications:    sodium chloride      dextrose      0.9% NaCl with KCl 40 mEq 75 mL/hr at 04/17/23 0112     Scheduled Medications:    sodium chloride flush  5-40 mL IntraVENous 2 times per day    [Held by provider] insulin glargine  0.25 Units/kg SubCUTAneous Nightly    insulin lispro  0-4 Units SubCUTAneous TID WC    insulin lispro  0-4 Units SubCUTAneous Nightly    [Held by provider] insulin lispro  5 Units SubCUTAneous 4x Daily AC & HS     PRN Meds: albuterol, sodium chloride flush, sodium chloride, ondansetron **OR** ondansetron, polyethylene glycol, acetaminophen **OR** acetaminophen, glucose, dextrose bolus **OR** dextrose bolus, glucagon (rDNA), dextrose    Labs:
Large infiltrative fungating lesion at the distal rectum/anal canal.  Difficult to assess whether anal or rectal origin. Frozen section sent for assistance. Addendum: Discussed with  in the pathology department.   Biopsies consistent with adenocarcinoma of the rectum
PATIENT HAS LARGE PUS FILLED MASS/MASSES AROUND RECTALOPENING. APPEARS TO BE NECROTIC, REDDENED, BLACK AND BLUE. FECES AND PUS SEEPING.
Patient arrived from ED to 183. Patient ambulated from bed to wheelchair, alert and oriented x 4. Large purple/red anorectal carcinoma noted on right side of buttocks/rectum. Patient's BG was noted to be 625 in ED. Upon repeating it, BG was 154 and 102. Insulin was held and Dr. Amy Church notified. A redraw of the BMP ordered. VSS and patient resting comfortably through the night. Report given to [facility], RN.
Requirements: Current  Protein (g/day): ~62g (1.5g/kg)  Method Used for Fluid Requirements: 1 ml/kcal  Fluid (ml/day): ~1500ml    Nutrition Diagnosis:   Underweight related to inadequate protein-energy intake as evidenced by BMI, poor intake prior to admission    Nutrition Interventions:   Food and/or Nutrient Delivery: Continue NPO (carb control 4 with advancement with diabetic supplement TID)  Nutrition Education/Counseling: No recommendation at this time  Coordination of Nutrition Care: Continue to monitor while inpatient       Goals:     Goals: Initiate PO diet, within 2 days       Nutrition Monitoring and Evaluation:   Behavioral-Environmental Outcomes: None Identified  Food/Nutrient Intake Outcomes: Diet Advancement/Tolerance, IVF Intake  Physical Signs/Symptoms Outcomes: Biochemical Data, Weight, Meal Time Behavior, GI Status    Discharge Planning:     Too soon to determine     Lily Velasquez MS, RD, LD
does not have any advanced directives. She does not want any information at this time. Her next of kin is her . Per nurse, patient stated this morning that she did not want any workup or treatment. And she would be ok with hospice consult. However, she changed her mind when her family was in the room. Most recent notable labs: Na 134, BUN 4, Creat 0.41, Total protein 5.9, ALBUMIN 2.5, Alk phos 154, WBC 17.8, Hgb 11.2, Plt 484    4/18/23    Upon entering room, patient is alert and oriented x 4. She reports having some pain in her rectum. She declined any pain medication at this time. Patient denies SOB or CP. Per nurse, patient has discharge orders but oncologist should be rounding soon to give any possible treatment options. I had another lengthy discussion with patient on code status and goals of care. Patient was adamant that she would not want chemo or radiation if it was an option. She reports she just wants to go home. I again described the code statuses in depth. Patient made the decision to be comfort care only. I discussed going home with palliative care vs. Hospice care. Patient was agreeable for hospice care. Most recent notable labs: BUN 5, creatinine 0.41, calcium 8.2, total protein 5.1, albumin 2.4, white count 16.0, hemoglobin 10.5, platelets 633    Review of Systems:       Review of Systems   Constitutional:  Positive for activity change, fatigue and unexpected weight change. HENT:  Negative for congestion, sinus pressure, sinus pain and trouble swallowing. Eyes:  Negative for visual disturbance. Respiratory:  Positive for cough. Negative for shortness of breath. Cardiovascular:  Negative for chest pain and leg swelling. Gastrointestinal:  Positive for blood in stool and rectal pain. Negative for abdominal pain, constipation, diarrhea and nausea. Endocrine: Negative for polydipsia and polyphagia. Genitourinary:  Negative for difficulty urinating.    Musculoskeletal:
history  ?  20 pack years  []   Pulmonary Disorder  (acute or chronic)  [x]   Severe or Chronic w/ Exacerbation  []     Surgical Status No [x]   Surgeries     General []   Surgery Lower []   Abdominal Thoracic or []   Upper Abdominal Thoracic with  PulmonaryDisorder  []     Chest X-ray Clear/Not  Ordered     [x]  Chronic Changes  Results Pending  []  Infiltrates, atelectasis, pleural effusion, or edema  []  Infiltrates in more than one lobe []  Infiltrate + Atelectasis, &/or pleural effusion  []    Respiratory Pattern Regular,  RR = 12-20 [x]  Increased,  RR = 21-25 []  BLACK, irregular,  or RR = 26-30 []  Decreased FEV1  or RR = 31-35 []  Severe SOB, use  of accessory muscles, or RR ? 35  []    Mental Status Alert, oriented,  Cooperative [x]  Confused but Follows commands []  Lethargic or unable to follow commands []  Obtunded  []  Comatose  []    Breath Sounds Clear to  auscultation  []  Decreased unilaterally or  in bases only [x]  Decreased  bilaterally  []  Crackles or intermittent wheezes []  Wheezes []    Cough Strong, Spontan., & nonproductive [x]  Strong,  spontaneous, &  productive []  Weak,  Nonproductive []  Weak, productive or  with wheezes []  No spontaneous  cough or may require suctioning []    Level of Activity Ambulatory []  Ambulatory w/ Assist  [x]  Non-ambulatory []  Paraplegic []  Quadriplegic []    Total    Score:____5___     Triage Score:__5______      Tri       Triage:     1. (>20) Freq: Q3    2. (16-20) Freq: Q4   3. (11-15) Freq: QID & Albuterol Q2 PRN    4. (6-10) Freq: TID & Albuterol Q2 PRN    5. (0-5) Freq Q4prn

## 2023-04-18 NOTE — CONSULTS
Department of General Surgery - Adult  Surgical Service colorectal surgery  Attending Consult Note      Reason for Consult: Abnormal CAT scan/rectal bleeding      CHIEF COMPLAINT: Rectal bleeding    History Obtained From:  patient, electronic medical record    HISTORY OF PRESENT ILLNESS:                The patient is a 75 y.o. female who presents with rectal bleeding for the past few months. She reports having a colonoscopy 5 years ago which she described as unremarkable. She was admitted to the hospital after a CAT scan showed evidence of a rectal mass along with hepatic bilobar metastasis. I was asked to see her regarding endoscopic evaluation for histologic diagnosis. Patient's past medical and surgical history was reviewed. I reviewed her CAT scan as well as accompanying blood work.     Past Medical History:        Diagnosis Date    Asthma      Past Surgical History:        Procedure Laterality Date     SECTION      DILATION AND CURETTAGE OF UTERUS       Current Medications:   Current Facility-Administered Medications: albuterol (PROVENTIL) nebulizer solution 2.5 mg, 2.5 mg, Nebulization, Q4H PRN  lactated ringers IV soln infusion, , IntraVENous, Continuous  sodium chloride flush 0.9 % injection 5-40 mL, 5-40 mL, IntraVENous, 2 times per day  sodium chloride flush 0.9 % injection 5-40 mL, 5-40 mL, IntraVENous, PRN  0.9 % sodium chloride infusion, , IntraVENous, PRN  ondansetron (ZOFRAN-ODT) disintegrating tablet 4 mg, 4 mg, Oral, Q8H PRN **OR** ondansetron (ZOFRAN) injection 4 mg, 4 mg, IntraVENous, Q6H PRN  polyethylene glycol (GLYCOLAX) packet 17 g, 17 g, Oral, Daily PRN  acetaminophen (TYLENOL) tablet 650 mg, 650 mg, Oral, Q6H PRN **OR** acetaminophen (TYLENOL) suppository 650 mg, 650 mg, Rectal, Q6H PRN  glucose chewable tablet 16 g, 4 tablet, Oral, PRN  dextrose bolus 10% 125 mL, 125 mL, IntraVENous, PRN **OR** dextrose bolus 10% 250 mL, 250 mL, IntraVENous, PRN  glucagon (rDNA) injection
Eyes:      General: Lids are normal.      Extraocular Movements: Extraocular movements intact. Pupils: Pupils are equal, round, and reactive to light. Cardiovascular:      Rate and Rhythm: Normal rate and regular rhythm. Pulses:           Dorsalis pedis pulses are 1+ on the right side and 1+ on the left side. Heart sounds: Normal heart sounds. Pulmonary:      Effort: Pulmonary effort is normal. No respiratory distress. Breath sounds: Decreased breath sounds present. No wheezing, rhonchi or rales. Abdominal:      General: Abdomen is flat. Bowel sounds are normal.      Palpations: Abdomen is soft. Tenderness: There is no abdominal tenderness. There is no guarding. Musculoskeletal:      Cervical back: Neck supple. Right lower leg: No edema. Left lower leg: No edema. Skin:     General: Skin is warm and dry. Findings: No bruising, rash or wound. Comments: Large rectal mass   Neurological:      General: No focal deficit present. Mental Status: She is alert and oriented to person, place, and time. Motor: Weakness present. No tremor. Psychiatric:         Attention and Perception: Perception normal. She is inattentive. Mood and Affect: Affect is flat. Speech: Speech normal.         Behavior: Behavior is withdrawn. Behavior is cooperative. Thought Content: Thought content normal.         Cognition and Memory: Cognition normal.         Judgment: Judgment normal.       Allergies:       Allergies   Allergen Reactions    Sulfa Antibiotics Hives       Medications:      Current Facility-Administered Medications   Medication Dose Route Frequency Provider Last Rate Last Admin    albuterol (PROVENTIL) nebulizer solution 2.5 mg  2.5 mg Nebulization Q4H PRN Kaiden Sotelo MD   2.5 mg at 04/17/23 7889    sodium chloride flush 0.9 % injection 5-40 mL  5-40 mL IntraVENous 2 times per day Kaiden Sotelo MD        sodium chloride flush 0.9
Value Ref Range    POC Glucose 441 (HH) 70 - 99 mg/dl    Performed on ACCU-CHEK    Hemoglobin and Hematocrit    Collection Time: 04/18/23 12:06 PM   Result Value Ref Range    Hemoglobin 11.3 (L) 12.0 - 16.0 g/dL    Hematocrit 34.1 (L) 37.0 - 47.0 %   POCT Glucose    Collection Time: 04/18/23  4:18 PM   Result Value Ref Range    POC Glucose 307 (H) 70 - 99 mg/dl    Performed on ACCU-CHEK       Latest Reference Range & Units 04/16/23 21:14   ,C125 <38 U/mL 45 (H)   CA 19-9 0 - 35 U/mL 528 (H)   CEA <3.9 ng/mL 267.4 (H)   (H): Data is abnormally high    RADIOLOGY RESULTS:  CT ABDOMEN PELVIS W IV CONTRAST Additional Contrast? None    Result Date: 4/16/2023  EXAMINATION: CT OF THE ABDOMEN AND PELVIS WITH CONTRAST 4/16/2023 4:49 pm TECHNIQUE: CT of the abdomen and pelvis was performed with the administration of intravenous contrast. Multiplanar reformatted images are provided for review. Automated exposure control, iterative reconstruction, and/or weight based adjustment of the mA/kV was utilized to reduce the radiation dose to as low as reasonably achievable. COMPARISON: None. HISTORY: ORDERING SYSTEM PROVIDED HISTORY: ab pain TECHNOLOGIST PROVIDED HISTORY: Reason for exam:->ab pain Additional Contrast?->None Decision Support Exception - unselect if not a suspected or confirmed emergency medical condition->Emergency Medical Condition (MA) What reading provider will be dictating this exam?->CRC FINDINGS: Lower Chest: Multiple bilateral lower lobe pulmonary nodules, largest in the anterior left lower lobe measures 2.7 cm. Findings are compatible with metastatic disease. No pleural fluid. No focal pneumonia. There is a moderate sliding-type hiatal hernia. Heart appears normal in size. Organs: Liver is normal in size. There is a large heterogeneous area of low-density in the inferior medial right hepatic lobe involving hepatic segments 5 and 6. The lobular mass appears to measure 7.1 x 7.3 x 5.1 cm.  There is

## 2023-04-18 NOTE — DISCHARGE INSTR - COC
Continuity of Care Form    Patient Name: Merced Doherty   :  1955  MRN:  [id number]    Admit date:  2023  Discharge date:  ***    Code Status Order: [facility]   Advance Directives:   [address] [facility] Documentation       Date/Time Healthcare Directive Type of Healthcare Directive Copy in [address] Agent's Name Healthcare Agent's Phone Number    [phone] No, patient does not have an advance directive for healthcare treatment -- -- -- -- --            Admitting Physician:  Melita Anderson MD  PCP: Erika Martinez DO    Discharging Nurse: [facility] Unit/Room#: N654/R823-51  Discharging Unit Phone Number: ***    Emergency Contact:   Extended Emergency Contact Information  Primary Emergency Contact: Navdeep Hanks  Home Phone: [phone]  Relation: Spouse    Past Surgical History:  Past Surgical History:   Procedure Laterality Date     SECTION      DILATION AND CURETTAGE OF UTERUS      SIGMOIDOSCOPY N/A 2023    FLEXIBLE SIGMOIDOSCOPY performed by Shira Griffin MD at [facility]       Immunization History: There is no immunization history on file for this patient.     Active Problems:  Patient Active Problem List   Diagnosis Code    Hyperglycemia R73.9    Malignant neoplasm of anorectum (HCC) C21.8    Colon cancer metastasized to multiple sites [facility]) C18.9    Palliative care encounter Z51.5    Advanced care planning/counseling discussion Z71.89    Goals of care, counseling/discussion Z71.89    Encounter for hospice care discussion Z71.89       Isolation/Infection:   Isolation            No Isolation          Patient Infection Status       None to display            Nurse Assessment:  Last Vital Signs: /70   Pulse 96   Temp 97.3 °F (36.3 °C) (Oral)   Resp 18   Ht 4' 11\" (1.499 m)   Wt 91 lb (41.3 kg)   SpO2 98%   BMI 18.38 kg/m²     Last documented pain score (0-10 scale): Pain Level: 3  Last Weight:   Wt Readings from

## 2023-04-19 LAB — AFP-TM SERPL-MCNC: 3.4 UG/L

## 2023-04-21 LAB — BACTERIA BLD CULT: NORMAL

## 2023-04-28 ENCOUNTER — OFFICE VISIT (OUTPATIENT)
Dept: PALLATIVE CARE | Age: 68
End: 2023-04-28
Payer: MEDICARE

## 2023-04-28 ENCOUNTER — HOSPITAL ENCOUNTER (OUTPATIENT)
Dept: RADIATION ONCOLOGY | Age: 68
Discharge: HOME | End: 2023-04-28
Payer: MEDICARE

## 2023-04-28 VITALS
SYSTOLIC BLOOD PRESSURE: 113 MMHG | WEIGHT: 91 LBS | TEMPERATURE: 98.7 F | BODY MASS INDEX: 18.35 KG/M2 | OXYGEN SATURATION: 98 % | DIASTOLIC BLOOD PRESSURE: 54 MMHG | RESPIRATION RATE: 18 BRPM | HEIGHT: 59 IN | HEART RATE: 105 BPM

## 2023-04-28 DIAGNOSIS — Z71.89 GOALS OF CARE, COUNSELING/DISCUSSION: ICD-10-CM

## 2023-04-28 DIAGNOSIS — C21.8 MALIGNANT NEOPLASM OF ANORECTUM (HCC): Primary | ICD-10-CM

## 2023-04-28 DIAGNOSIS — Z71.89 ADVANCED CARE PLANNING/COUNSELING DISCUSSION: ICD-10-CM

## 2023-04-28 DIAGNOSIS — C20 RECTAL CANCER (HCC): Primary | ICD-10-CM

## 2023-04-28 DIAGNOSIS — C18.9 COLON CANCER METASTASIZED TO MULTIPLE SITES (HCC): ICD-10-CM

## 2023-04-28 DIAGNOSIS — R63.4 UNINTENTIONAL WEIGHT LOSS: ICD-10-CM

## 2023-04-28 DIAGNOSIS — R11.2 NAUSEA AND VOMITING IN ADULT: ICD-10-CM

## 2023-04-28 DIAGNOSIS — Z51.5 PALLIATIVE CARE ENCOUNTER: ICD-10-CM

## 2023-04-28 DIAGNOSIS — G89.3 CANCER RELATED PAIN: ICD-10-CM

## 2023-04-28 PROCEDURE — 77263 THER RADIOLOGY TX PLNG CPLX: CPT | Performed by: RADIOLOGY

## 2023-04-28 PROCEDURE — 99215 OFFICE O/P EST HI 40 MIN: CPT

## 2023-04-28 PROCEDURE — 99215 OFFICE O/P EST HI 40 MIN: CPT | Performed by: RADIOLOGY

## 2023-04-28 PROCEDURE — 99214 OFFICE O/P EST MOD 30 MIN: CPT | Performed by: RADIOLOGY

## 2023-04-28 PROCEDURE — G9692 HOSP RECD BY PT DUR MSMT PER: HCPCS

## 2023-04-28 PROCEDURE — 99497 ADVNCD CARE PLAN 30 MIN: CPT | Performed by: RADIOLOGY

## 2023-04-28 RX ORDER — ACETAMINOPHEN 500 MG
500 TABLET ORAL EVERY 6 HOURS PRN
COMMUNITY

## 2023-04-28 NOTE — PROGRESS NOTES
NURSING ASSESSMENT     Date: 2023        Patient Name: Juliana Thacker     YOB: 1955      Age:  75 y.o. MRN: [id number]       Chaperone [x] Yes   [] No   Urban Cough and son-Jorden    Advance Directives:   Do you currently have completed advance directives (living will)? [] Yes   [x] No         *If yes, please bring us a copy for your records. *If no, would you like info or assistance in completing advance directives (living will)? [x] Yes   [] No    Pain Score:   Pain Score (1-10): 0  Pain Location: rectal  Pain Duration: intermittent   Pain Management/Control: sitting, with bowel movements      Is pain affecting your ability to take care of yourself or move throughout your home? [] Yes   [x] No    General: Anorexia, Weight Loss, Weakness, and Fatigue  Patient has gained weight [] Yes   [x] No  Patient has lost weight [x] Yes   [] No  How much weight in pounds and over what length of time: 35lb over the past several months    Eyes (Ophthalmic): No Problem, wears glasses     Skin (Dermatological):  No Problems     ENT: No Problems     Respiratory: Cough and BLACK, cough is dry     Cardiovascular: No Problems      Device   [] Yes   [x] No   Copy of Card Obtained [] Yes   [x] No    Gastrointestinal: has lost weight as above, decreased appetite, pt denies nausea, has thin stool for several months with blood noted every 4-5 days, occasional pain with BM    Genito-Urinary: No Problems     Breast: No Problems     Musculoskeletal: No Problems    Neurological: Numbness and Tingling in hands, generalized weakness, occasional  dizziness when bending over      Hematological and Lymphatic: No Problems     Endocrine: denies DM but was prescribed metformin in the  hospital but doesn't have    Gyn History:   /Para:   Age of Menarche: 14  Age of Menopause 51's  Vaginal Bleeding:  no  First Pregnancy: 34  Breast Feeding:  no  Last Menstrual period:   Oral

## 2023-04-28 NOTE — PROGRESS NOTES
Teaching & Instructions - Pelvis  General  Simulation  Initial Treatment  Self-Care Info  Nutrition  Social Service    Site-Specific  Side Effects  Cystitis Mgmt  Bowel Mgmt, gas x  Fatigue Mgmt  Perineal/Vaginal Care  Proctitis Mgmt  Sexuality Issues  Skin Care, aquaphor  Vaginal Dilation  Vaginal Implant      Educational Handouts  Radiation Therapy & You  Site Specific Instructions  Radiation Oncology Dept Information  CBMS Program    Patient eager to learn, verbalized understanding of verbal education and handouts.

## 2023-05-01 ENCOUNTER — HOSPITAL ENCOUNTER (OUTPATIENT)
Dept: RADIATION ONCOLOGY | Age: 68
Discharge: HOME | End: 2023-05-01
Payer: MEDICARE

## 2023-05-01 PROCEDURE — 77338 DESIGN MLC DEVICE FOR IMRT: CPT | Performed by: RADIOLOGY

## 2023-05-01 PROCEDURE — 77300 RADIATION THERAPY DOSE PLAN: CPT | Performed by: RADIOLOGY

## 2023-05-01 PROCEDURE — 77386 HC NTSTY MODUL RAD TX DLVR CPLX: CPT | Performed by: RADIOLOGY

## 2023-05-01 PROCEDURE — 77301 RADIOTHERAPY DOSE PLAN IMRT: CPT | Performed by: RADIOLOGY

## 2023-05-01 PROCEDURE — G6002 STEREOSCOPIC X-RAY GUIDANCE: HCPCS | Performed by: RADIOLOGY

## 2023-05-01 NOTE — PROGRESS NOTES
SW introduced self to pt prior to her first treatment, as she was seen for consultation in radiaiton therapy for treatment of her rectal cancer on 4/28/2023. At that time, pt endorsed a distress of 7/10, and she had requested information about Advance Directives. As pt went in for treatment, SW provided business card and documents to pt's  and son. Pt was invited to contact SW should she have need, wish for support, or request assistance in completing Advance Directive documents. TARYN will continue to follow as needed through radiation therapy.

## 2023-05-02 ENCOUNTER — HOSPITAL ENCOUNTER (OUTPATIENT)
Dept: RADIATION ONCOLOGY | Age: 68
Discharge: HOME | End: 2023-05-02
Payer: MEDICARE

## 2023-05-02 PROCEDURE — G6002 STEREOSCOPIC X-RAY GUIDANCE: HCPCS | Performed by: RADIOLOGY

## 2023-05-02 PROCEDURE — 77386 HC NTSTY MODUL RAD TX DLVR CPLX: CPT | Performed by: RADIOLOGY

## 2023-05-02 RX ORDER — ONDANSETRON 4 MG/1
4 TABLET, ORALLY DISINTEGRATING ORAL 3 TIMES DAILY PRN
Qty: 21 TABLET | Refills: 0 | Status: SHIPPED | OUTPATIENT
Start: 2023-05-02 | End: 2023-05-09

## 2023-05-02 RX ORDER — OXYCODONE HYDROCHLORIDE AND ACETAMINOPHEN 5; 325 MG/1; MG/1
1 TABLET ORAL EVERY 8 HOURS PRN
Qty: 21 TABLET | Refills: 0 | Status: SHIPPED | OUTPATIENT
Start: 2023-05-02 | End: 2023-05-09

## 2023-05-03 ENCOUNTER — APPOINTMENT (OUTPATIENT)
Dept: RADIATION ONCOLOGY | Age: 68
End: 2023-05-03
Payer: MEDICARE

## 2023-05-03 ENCOUNTER — HOSPITAL ENCOUNTER (OUTPATIENT)
Dept: RADIATION ONCOLOGY | Age: 68
Discharge: HOME | End: 2023-05-03
Payer: MEDICARE

## 2023-05-03 PROCEDURE — 77386 HC NTSTY MODUL RAD TX DLVR CPLX: CPT | Performed by: RADIOLOGY

## 2023-05-03 PROCEDURE — G6002 STEREOSCOPIC X-RAY GUIDANCE: HCPCS | Performed by: RADIOLOGY

## 2023-05-04 ENCOUNTER — HOSPITAL ENCOUNTER (OUTPATIENT)
Dept: RADIATION ONCOLOGY | Age: 68
Discharge: HOME | End: 2023-05-04
Payer: MEDICARE

## 2023-05-04 VITALS
SYSTOLIC BLOOD PRESSURE: 141 MMHG | OXYGEN SATURATION: 99 % | HEART RATE: 95 BPM | DIASTOLIC BLOOD PRESSURE: 62 MMHG | RESPIRATION RATE: 18 BRPM | WEIGHT: 91 LBS | BODY MASS INDEX: 18.38 KG/M2 | TEMPERATURE: 97.8 F

## 2023-05-04 DIAGNOSIS — C21.8 MALIGNANT NEOPLASM OF ANORECTUM (HCC): ICD-10-CM

## 2023-05-04 DIAGNOSIS — C18.9 COLON CANCER METASTASIZED TO MULTIPLE SITES (HCC): Primary | ICD-10-CM

## 2023-05-04 PROCEDURE — 77386 HC NTSTY MODUL RAD TX DLVR CPLX: CPT | Performed by: RADIOLOGY

## 2023-05-04 NOTE — PROGRESS NOTES
Nutrition Education    Educated on diet for poor appetitie  Learners: Patient  Readiness: Acceptance  Method: Explanation  Response: Verbalizes Understanding  Contact name and number provided. Pt denies n/v/d/c. State Bms every other day, which is her normal.  Pt taking 2-3 Ensure per day between meals. Pt will eat other half of her meal a couple hours later if she doesn't finish it. Instructed on calorie boosting. Pt reports stable wts lately. 91# today. Provided contact information for any questions or concerns.   Geri Funes RD, LD  Contact Number: [phone]

## 2023-05-04 NOTE — PROGRESS NOTES
17 [facility]           Radiation Oncology      [address] [facility]        [address]        Tila Castanon: [phone]        F: [phone]       [email or web address]                   Dr. Anson Davalos MD PhD    ON TREATMENT VISIT (OTV) NOTE     Date of Service: 2023  Patient ID: Rob Webster   : 1955  MRN: [id number]   Acct Number: [de-identified]     DIAGNOSIS:  Cancer Staging   Malignant neoplasm of anorectum [facility])  Staging form: Colon And Rectum, AJCC 8th Edition  - Clinical: Stage IVB [place], [place]) - Signed by Anson Davalos MD on 2023     Treatment Area: Pelvis- Female    Current Total Dose(cGy): 2000  Current Fraction: 4    Patient was seen today for weekly visit. Wt Readings from Last 3 Encounters:   23 91 lb (41.3 kg)   23 91 lb (41.3 kg)   23 91 lb (41.3 kg)       BP (!) 141/62   Pulse 95   Temp 97.8 °F (36.6 °C) (Temporal)   Resp 18   Wt 91 lb (41.3 kg)   SpO2 99%   BMI 18.38 kg/m²     Lab Results   Component Value Date    WBC 16.0 (H) 2023     (H) 2023       Comfort Alteration  Fatigue:None, able to perform daily activities    Pain Location: slight pain, rectal  Pain Intensity (Current): 4 Moderate pain  Pain Treatment: Tylenol occasional taking percocet  Pain Relief: Pain relieved 75%    Emotional Alteration:   Coping: somewhat effective \"feeling grouchy\"    Nutritional Alteration  Anorexia: Loss of appetite but able to eat smaller portions of food and/or liquids.  Using 2-3  Ensures daily as a supplement  Nausea: No nausea noted  Vomiting: No vomiting     Skin Alteration   Skin reaction: No changes noted    Elimination Alterations  Urinary Frequency: None  Urinary Retention: Normal  Urinary Incontinence: None  Dysuria: None  Nocturia: None  Proctitis: None, slight amount of blood noted  Diarrhea: None, Reports regular bowel movements, every other day which patient reports is normal.     Additional Comments:

## 2023-05-05 ENCOUNTER — PREP FOR PROCEDURE (OUTPATIENT)
Dept: CARDIOTHORACIC SURGERY | Age: 68
End: 2023-05-05

## 2023-05-05 ENCOUNTER — HOSPITAL ENCOUNTER (OUTPATIENT)
Dept: RADIATION ONCOLOGY | Age: 68
Discharge: HOME | End: 2023-05-05
Payer: MEDICARE

## 2023-05-05 PROCEDURE — 77386 HC NTSTY MODUL RAD TX DLVR CPLX: CPT | Performed by: RADIOLOGY

## 2023-05-05 PROCEDURE — 77336 RADIATION PHYSICS CONSULT: CPT | Performed by: RADIOLOGY

## 2023-05-05 RX ORDER — SODIUM CHLORIDE 9 MG/ML
INJECTION, SOLUTION INTRAVENOUS PRN
Status: CANCELLED | OUTPATIENT
Start: 2023-05-11

## 2023-05-05 RX ORDER — SODIUM CHLORIDE 0.9 % (FLUSH) 0.9 %
5-40 SYRINGE (ML) INJECTION PRN
Status: CANCELLED | OUTPATIENT
Start: 2023-05-11

## 2023-05-05 RX ORDER — SODIUM CHLORIDE 0.9 % (FLUSH) 0.9 %
5-40 SYRINGE (ML) INJECTION EVERY 12 HOURS SCHEDULED
Status: CANCELLED | OUTPATIENT
Start: 2023-05-11

## 2023-05-05 NOTE — PROGRESS NOTES
17 [facility]           Radiation Oncology      [address] [facility]        [address]        Tila Castanon: [phone]        F: [phone]       [facility]                   Dr. Anson Davalos MD PhD    RADIATION TREATMENT SUMMARY NOTE     Date of Service: 2023  Patient ID: Rob Webster   : 1955  MRN: [id number]   Acct Number: [de-identified]       Patient Name:  Rob Webster  1955,  75 y.o., female       Referring Physician: Maryse Herrera, [address] [facility] Armando Michaels [facility] 86  [facility],  [address] [place] [address]      PCP: Gisselle Miramontes DO       Diagnosis: Malignant neoplasm of anorectum [facility])  Staging form: Colon And Rectum, AJCC 8th Edition  - Clinical: Stage IVB [place], [place]) - Signed by Anson Davalos MD on 2023        Recent History: This is a 75year old female with a metastatic adenocarcinoma of the rectum at presentation with lung and liver metastases and a large ulcerated lesion within the distal rectum extending into the intergluteal fold and skin of the right buttocks. She is dispositioned to receive palliative radiation therapy to the areas of known disease followed by systemic therapy to address her metastatic disease burden in the lung and liver. Site Start [facility] ED Fractions Dose Fx Dose Technique    Anal Pelvic  23   4     5  2500 cGY  500 cGy  VMAT               Concurrent therapy:      None    Technique:                 VMAT    Treatment course:       Patient tolerated therapy remarkably well with only mild fatigue but no overt nausea, vomiting, or diarrhea. She was instructed to be diligent about her skin care daily and will continue to still see her again for quick check. Plan: I will see her again for recheck in 3 to 4 weeks. In the interim she will go on to start systemic therapy under the care of medical oncology.         Anson Davalos MD PhD  Radiation Oncologist, [address] [place] [address]

## 2023-05-09 ASSESSMENT — ENCOUNTER SYMPTOMS
TROUBLE SWALLOWING: 0
NAUSEA: 1
RECTAL PAIN: 1
SHORTNESS OF BREATH: 0
CHEST TIGHTNESS: 0
BACK PAIN: 0
BLOOD IN STOOL: 1
VOMITING: 0
COLOR CHANGE: 0
COUGH: 0
ABDOMINAL PAIN: 0
VOICE CHANGE: 0

## 2023-05-11 ENCOUNTER — HOSPITAL ENCOUNTER (OUTPATIENT)
Age: 68
Setting detail: OUTPATIENT SURGERY
Discharge: HOME | End: 2023-05-11
Attending: COLON & RECTAL SURGERY | Admitting: COLON & RECTAL SURGERY
Payer: MEDICARE

## 2023-05-11 ENCOUNTER — ANESTHESIA EVENT (OUTPATIENT)
Dept: OPERATING ROOM | Age: 68
End: 2023-05-11
Payer: MEDICARE

## 2023-05-11 ENCOUNTER — APPOINTMENT (OUTPATIENT)
Dept: GENERAL RADIOLOGY | Age: 68
End: 2023-05-11
Attending: COLON & RECTAL SURGERY
Payer: MEDICARE

## 2023-05-11 ENCOUNTER — HOSPITAL ENCOUNTER (OUTPATIENT)
Dept: GENERAL RADIOLOGY | Age: 68
Setting detail: OUTPATIENT SURGERY
Discharge: HOME | End: 2023-05-13
Attending: COLON & RECTAL SURGERY
Payer: MEDICARE

## 2023-05-11 ENCOUNTER — ANESTHESIA (OUTPATIENT)
Dept: OPERATING ROOM | Age: 68
End: 2023-05-11
Payer: MEDICARE

## 2023-05-11 VITALS
BODY MASS INDEX: 18.35 KG/M2 | OXYGEN SATURATION: 98 % | RESPIRATION RATE: 16 BRPM | HEART RATE: 89 BPM | DIASTOLIC BLOOD PRESSURE: 79 MMHG | HEIGHT: 59 IN | WEIGHT: 91 LBS | TEMPERATURE: 97.1 F | SYSTOLIC BLOOD PRESSURE: 118 MMHG

## 2023-05-11 DIAGNOSIS — R52 PAIN: ICD-10-CM

## 2023-05-11 DIAGNOSIS — C18.9 COLON CANCER METASTASIZED TO MULTIPLE SITES (HCC): Primary | ICD-10-CM

## 2023-05-11 PROCEDURE — 2500000003 HC RX 250 WO HCPCS: Performed by: COLON & RECTAL SURGERY

## 2023-05-11 PROCEDURE — 2709999900 HC NON-CHARGEABLE SUPPLY: Performed by: COLON & RECTAL SURGERY

## 2023-05-11 PROCEDURE — 3700000000 HC ANESTHESIA ATTENDED CARE: Performed by: COLON & RECTAL SURGERY

## 2023-05-11 PROCEDURE — A4217 STERILE WATER/SALINE, 500 ML: HCPCS | Performed by: COLON & RECTAL SURGERY

## 2023-05-11 PROCEDURE — 3700000001 HC ADD 15 MINUTES (ANESTHESIA): Performed by: COLON & RECTAL SURGERY

## 2023-05-11 PROCEDURE — 6360000002 HC RX W HCPCS: Performed by: NURSE ANESTHETIST, CERTIFIED REGISTERED

## 2023-05-11 PROCEDURE — C1769 GUIDE WIRE: HCPCS | Performed by: COLON & RECTAL SURGERY

## 2023-05-11 PROCEDURE — 71045 X-RAY EXAM CHEST 1 VIEW: CPT

## 2023-05-11 PROCEDURE — 3209999900 FLUORO FOR SURGICAL PROCEDURES

## 2023-05-11 PROCEDURE — 2580000003 HC RX 258: Performed by: COLON & RECTAL SURGERY

## 2023-05-11 PROCEDURE — 3600000014 HC SURGERY LEVEL 4 ADDTL 15MIN: Performed by: COLON & RECTAL SURGERY

## 2023-05-11 PROCEDURE — 7100000011 HC PHASE II RECOVERY - ADDTL 15 MIN: Performed by: COLON & RECTAL SURGERY

## 2023-05-11 PROCEDURE — 7100000000 HC PACU RECOVERY - FIRST 15 MIN: Performed by: COLON & RECTAL SURGERY

## 2023-05-11 PROCEDURE — 7100000010 HC PHASE II RECOVERY - FIRST 15 MIN: Performed by: COLON & RECTAL SURGERY

## 2023-05-11 PROCEDURE — 2580000003 HC RX 258: Performed by: NURSE ANESTHETIST, CERTIFIED REGISTERED

## 2023-05-11 PROCEDURE — 7100000001 HC PACU RECOVERY - ADDTL 15 MIN: Performed by: COLON & RECTAL SURGERY

## 2023-05-11 PROCEDURE — 3600000004 HC SURGERY LEVEL 4 BASE: Performed by: COLON & RECTAL SURGERY

## 2023-05-11 PROCEDURE — 2580000003 HC RX 258: Performed by: STUDENT IN AN ORGANIZED HEALTH CARE EDUCATION/TRAINING PROGRAM

## 2023-05-11 PROCEDURE — 36561 INSERT TUNNELED CV CATH: CPT | Performed by: COLON & RECTAL SURGERY

## 2023-05-11 PROCEDURE — 76937 US GUIDE VASCULAR ACCESS: CPT | Performed by: COLON & RECTAL SURGERY

## 2023-05-11 PROCEDURE — 6360000002 HC RX W HCPCS: Performed by: COLON & RECTAL SURGERY

## 2023-05-11 PROCEDURE — 77001 FLUOROGUIDE FOR VEIN DEVICE: CPT | Performed by: COLON & RECTAL SURGERY

## 2023-05-11 PROCEDURE — C1788 PORT, INDWELLING, IMP: HCPCS | Performed by: COLON & RECTAL SURGERY

## 2023-05-11 DEVICE — PORT INFUS SGL LUMN ATTCH POLYUR OPN END CATH 8FR POWERPRT: Type: IMPLANTABLE DEVICE | Site: CHEST | Status: FUNCTIONAL

## 2023-05-11 RX ORDER — METOCLOPRAMIDE HYDROCHLORIDE 5 MG/ML
10 INJECTION INTRAMUSCULAR; INTRAVENOUS
Status: DISCONTINUED | OUTPATIENT
Start: 2023-05-11 | End: 2023-05-11 | Stop reason: HOSPADM

## 2023-05-11 RX ORDER — OXYCODONE HYDROCHLORIDE 5 MG/1
10 TABLET ORAL PRN
Status: DISCONTINUED | OUTPATIENT
Start: 2023-05-11 | End: 2023-05-11 | Stop reason: HOSPADM

## 2023-05-11 RX ORDER — SODIUM CHLORIDE 0.9 % (FLUSH) 0.9 %
5-40 SYRINGE (ML) INJECTION EVERY 12 HOURS SCHEDULED
Status: DISCONTINUED | OUTPATIENT
Start: 2023-05-11 | End: 2023-05-11 | Stop reason: HOSPADM

## 2023-05-11 RX ORDER — HEPARIN SODIUM (PORCINE) LOCK FLUSH IV SOLN 100 UNIT/ML 100 UNIT/ML
SOLUTION INTRAVENOUS PRN
Status: DISCONTINUED | OUTPATIENT
Start: 2023-05-11 | End: 2023-05-11 | Stop reason: HOSPADM

## 2023-05-11 RX ORDER — HYDROMORPHONE HYDROCHLORIDE 1 MG/ML
0.5 INJECTION, SOLUTION INTRAMUSCULAR; INTRAVENOUS; SUBCUTANEOUS EVERY 5 MIN PRN
Status: DISCONTINUED | OUTPATIENT
Start: 2023-05-11 | End: 2023-05-11 | Stop reason: HOSPADM

## 2023-05-11 RX ORDER — DEXTROSE MONOHYDRATE 100 MG/ML
INJECTION, SOLUTION INTRAVENOUS CONTINUOUS PRN
Status: DISCONTINUED | OUTPATIENT
Start: 2023-05-11 | End: 2023-05-11 | Stop reason: HOSPADM

## 2023-05-11 RX ORDER — SODIUM CHLORIDE 9 MG/ML
25 INJECTION, SOLUTION INTRAVENOUS PRN
Status: DISCONTINUED | OUTPATIENT
Start: 2023-05-11 | End: 2023-05-11 | Stop reason: HOSPADM

## 2023-05-11 RX ORDER — BUPIVACAINE HYDROCHLORIDE AND EPINEPHRINE 2.5; 5 MG/ML; UG/ML
INJECTION, SOLUTION EPIDURAL; INFILTRATION; INTRACAUDAL; PERINEURAL PRN
Status: DISCONTINUED | OUTPATIENT
Start: 2023-05-11 | End: 2023-05-11 | Stop reason: HOSPADM

## 2023-05-11 RX ORDER — MEPERIDINE HYDROCHLORIDE 25 MG/ML
12.5 INJECTION INTRAMUSCULAR; INTRAVENOUS; SUBCUTANEOUS EVERY 5 MIN PRN
Status: DISCONTINUED | OUTPATIENT
Start: 2023-05-11 | End: 2023-05-11 | Stop reason: HOSPADM

## 2023-05-11 RX ORDER — GLUCAGON 1 MG/ML
1 KIT INJECTION PRN
Status: DISCONTINUED | OUTPATIENT
Start: 2023-05-11 | End: 2023-05-11 | Stop reason: HOSPADM

## 2023-05-11 RX ORDER — IPRATROPIUM BROMIDE AND ALBUTEROL SULFATE 2.5; .5 MG/3ML; MG/3ML
1 SOLUTION RESPIRATORY (INHALATION)
Status: DISCONTINUED | OUTPATIENT
Start: 2023-05-11 | End: 2023-05-11 | Stop reason: HOSPADM

## 2023-05-11 RX ORDER — HYDRALAZINE HYDROCHLORIDE 20 MG/ML
10 INJECTION INTRAMUSCULAR; INTRAVENOUS
Status: DISCONTINUED | OUTPATIENT
Start: 2023-05-11 | End: 2023-05-11 | Stop reason: HOSPADM

## 2023-05-11 RX ORDER — PROPOFOL 10 MG/ML
INJECTION, EMULSION INTRAVENOUS CONTINUOUS PRN
Status: DISCONTINUED | OUTPATIENT
Start: 2023-05-11 | End: 2023-05-11 | Stop reason: SDUPTHER

## 2023-05-11 RX ORDER — CEFAZOLIN SODIUM IN 0.9 % NACL 2 G/100 ML
2000 PLASTIC BAG, INJECTION (ML) INTRAVENOUS
Status: COMPLETED | OUTPATIENT
Start: 2023-05-11 | End: 2023-05-11

## 2023-05-11 RX ORDER — ONDANSETRON 2 MG/ML
4 INJECTION INTRAMUSCULAR; INTRAVENOUS
Status: DISCONTINUED | OUTPATIENT
Start: 2023-05-11 | End: 2023-05-11 | Stop reason: HOSPADM

## 2023-05-11 RX ORDER — SODIUM CHLORIDE 0.9 % (FLUSH) 0.9 %
5-40 SYRINGE (ML) INJECTION PRN
Status: DISCONTINUED | OUTPATIENT
Start: 2023-05-11 | End: 2023-05-11 | Stop reason: HOSPADM

## 2023-05-11 RX ORDER — FENTANYL CITRATE 0.05 MG/ML
25 INJECTION, SOLUTION INTRAMUSCULAR; INTRAVENOUS EVERY 5 MIN PRN
Status: DISCONTINUED | OUTPATIENT
Start: 2023-05-11 | End: 2023-05-11 | Stop reason: HOSPADM

## 2023-05-11 RX ORDER — SODIUM CHLORIDE, SODIUM LACTATE, POTASSIUM CHLORIDE, CALCIUM CHLORIDE 600; 310; 30; 20 MG/100ML; MG/100ML; MG/100ML; MG/100ML
INJECTION, SOLUTION INTRAVENOUS CONTINUOUS PRN
Status: DISCONTINUED | OUTPATIENT
Start: 2023-05-11 | End: 2023-05-11 | Stop reason: SDUPTHER

## 2023-05-11 RX ORDER — SODIUM CHLORIDE 9 MG/ML
INJECTION, SOLUTION INTRAVENOUS PRN
Status: DISCONTINUED | OUTPATIENT
Start: 2023-05-11 | End: 2023-05-11 | Stop reason: HOSPADM

## 2023-05-11 RX ORDER — OXYCODONE HYDROCHLORIDE AND ACETAMINOPHEN 5; 325 MG/1; MG/1
1 TABLET ORAL EVERY 6 HOURS PRN
Qty: 10 TABLET | Refills: 0 | Status: SHIPPED | OUTPATIENT
Start: 2023-05-11 | End: 2023-05-14

## 2023-05-11 RX ORDER — OXYCODONE HYDROCHLORIDE 5 MG/1
5 TABLET ORAL PRN
Status: DISCONTINUED | OUTPATIENT
Start: 2023-05-11 | End: 2023-05-11 | Stop reason: HOSPADM

## 2023-05-11 RX ORDER — MAGNESIUM HYDROXIDE 1200 MG/15ML
LIQUID ORAL CONTINUOUS PRN
Status: DISCONTINUED | OUTPATIENT
Start: 2023-05-11 | End: 2023-05-11 | Stop reason: HOSPADM

## 2023-05-11 RX ORDER — LABETALOL HYDROCHLORIDE 5 MG/ML
10 INJECTION, SOLUTION INTRAVENOUS
Status: DISCONTINUED | OUTPATIENT
Start: 2023-05-11 | End: 2023-05-11 | Stop reason: HOSPADM

## 2023-05-11 RX ORDER — MIDAZOLAM HYDROCHLORIDE 1 MG/ML
INJECTION INTRAMUSCULAR; INTRAVENOUS PRN
Status: DISCONTINUED | OUTPATIENT
Start: 2023-05-11 | End: 2023-05-11 | Stop reason: SDUPTHER

## 2023-05-11 RX ORDER — SODIUM CHLORIDE, SODIUM LACTATE, POTASSIUM CHLORIDE, CALCIUM CHLORIDE 600; 310; 30; 20 MG/100ML; MG/100ML; MG/100ML; MG/100ML
INJECTION, SOLUTION INTRAVENOUS CONTINUOUS
Status: DISCONTINUED | OUTPATIENT
Start: 2023-05-11 | End: 2023-05-11 | Stop reason: HOSPADM

## 2023-05-11 RX ADMIN — PROPOFOL 75 MCG/KG/MIN: 10 INJECTION, EMULSION INTRAVENOUS at 10:00

## 2023-05-11 RX ADMIN — MIDAZOLAM HYDROCHLORIDE 2 MG: 1 INJECTION, SOLUTION INTRAMUSCULAR; INTRAVENOUS at 09:50

## 2023-05-11 RX ADMIN — Medication 2000 MG: at 09:58

## 2023-05-11 RX ADMIN — SODIUM CHLORIDE, POTASSIUM CHLORIDE, SODIUM LACTATE AND CALCIUM CHLORIDE 1000 ML: 600; 310; 30; 20 INJECTION, SOLUTION INTRAVENOUS at 09:02

## 2023-05-11 RX ADMIN — SODIUM CHLORIDE, POTASSIUM CHLORIDE, SODIUM LACTATE AND CALCIUM CHLORIDE: 600; 310; 30; 20 INJECTION, SOLUTION INTRAVENOUS at 09:54

## 2023-05-11 ASSESSMENT — PAIN DESCRIPTION - DESCRIPTORS: DESCRIPTORS: SORE

## 2023-05-11 ASSESSMENT — PAIN - FUNCTIONAL ASSESSMENT: PAIN_FUNCTIONAL_ASSESSMENT: 0-10

## 2023-05-11 NOTE — BRIEF OP NOTE
Brief Postoperative Note      Patient: Franky Castellanos  YOB: 1955  MRN: [id number]    Date of Procedure: 5/11/2023    Pre-Op Diagnosis Codes:     * Rectal cancer ([facility] [address] [C20]    Post-Op Diagnosis: Same       Procedure(s):  left infusaport placement, ultrasound-guided    Surgeon(s):  Courtney Whitney MD    Assistant:  First Assistant: Radha Coronado    Anesthesia: Monitor Anesthesia Care, local    Estimated Blood Loss (mL): 40    Complications: None    Specimens:   * No specimens in log *    Implants:  Implant Name Type Inv.  Item Serial No.  Lot No. LRB No. Used Action   PORT INFUS SGL LUMN ATTCH POLYUR OPN END CATH 8FR POWERPRT - [id number]  PORT INFUS SGL LUMN ATTCH POLYUR OPN END CATH 8FR POWERPRT  [employer]ent[employer]-WD [id number] Left 1 Implanted         Drains: * No LDAs found *    Findings: Ultrasound-guided left internal jugular 8 [place] Riahtw-w-Yfas placed      Electronically signed by Lb Prather MD on 5/11/2023 at 10:32 AM

## 2023-05-11 NOTE — ANESTHESIA PRE PROCEDURE
Department of Anesthesiology  Preprocedure Note       Name:  Romayne Motto   Age:  75 y.o.  :  1955                                          MRN:  [id number]         Date:  2023      Surgeon: Emily Peterson):  Brie Robison MD    Procedure: Procedure(s):  left infusaport placement, PAT on admit  c-arm    Medications prior to admission:   Prior to Admission medications    Medication Sig Start Date End Date Taking? Authorizing Provider   acetaminophen (TYLENOL) 500 MG tablet Take 1 tablet by mouth every 6 hours as needed for Pain    Historical Provider, MD   metFORMIN (GLUCOPHAGE) 500 MG tablet Take 1 tablet by mouth daily (with breakfast)  Patient not taking: Reported on 2023   Brendan Ocampo MD   ALBUTEROL IN Inhale 2 puffs into the lungs as needed  Patient not taking: Reported on 2023    Historical Provider, MD       Current medications:    Current Facility-Administered Medications   Medication Dose Route Frequency Provider Last Rate Last Admin    sodium chloride flush 0.9 % injection 5-40 mL  5-40 mL IntraVENous 2 times per day Brie Robison MD        sodium chloride flush 0.9 % injection 5-40 mL  5-40 mL IntraVENous PRN Brie Robison MD        0.9 % sodium chloride infusion   IntraVENous PRN Brie Robison MD        ceFAZolin (ANCEF) 2000 mg in 0.9% sodium chloride 100 mL IVPB  2,000 mg IntraVENous On Call to Govind Castellanos MD        lactated ringers IV soln infusion   IntraVENous Continuous Adisbreanna Guajardo  mL/hr at 23 0902 1,000 mL at 23 0902       Allergies:     Allergies   Allergen Reactions    Sulfa Antibiotics Hives       Problem List:    Patient Active Problem List   Diagnosis Code    Hyperglycemia R73.9    Malignant neoplasm of anorectum ([facility] [address] C21.8    Colon cancer metastasized to multiple sites ([facility] [address] C18.9    Palliative care encounter Z51.5    Advanced care planning/counseling discussion Z71.89    Goals of

## 2023-05-11 NOTE — ANESTHESIA POSTPROCEDURE EVALUATION
Department of Anesthesiology  Postprocedure Note    Patient: Esperanza Kumar  MRN: [id number]  YOB: 1955  Date of evaluation: 5/11/2023      Procedure Summary     Date: 05/11/23 Room / Location: [facility] OR [address] [place] [address]    Anesthesia Start: 8969 Anesthesia Stop: 1427    Procedure: left infusaport placement (Left) Diagnosis:       Rectal cancer ([address]      (Rectal cancer ([address] Marleen Bagley)    Surgeons: Leroy Bowman MD Responsible Provider: Samm Butts MD    Anesthesia Type: MAC ASA Status: 2          Anesthesia Type: No value filed.     Mu Phase I: Mu Score: 10    Mu Phase II:        Anesthesia Post Evaluation    Patient location during evaluation: PACU  Patient participation: complete - patient cannot participate  Level of consciousness: awake and alert  Pain score: 0  Airway patency: patent  Nausea & Vomiting: no vomiting and no nausea  Complications: no  Cardiovascular status: blood pressure returned to baseline and hemodynamically stable  Respiratory status: nonlabored ventilation, face mask, acceptable and spontaneous ventilation  Hydration status: euvolemic

## 2023-05-11 NOTE — OP NOTE
[address]                      [facility], [address] [facility]                                OPERATIVE REPORT    PATIENT NAME: Lissa Styles                   :        1955  MED REC NO:   [id number]                            ROOM:  ACCOUNT NO:   [de-identified]                           ADMIT DATE: 2023  PROVIDER:     En Onofre MD    DATE OF PROCEDURE:  2023    PREOPERATIVE DIAGNOSIS:  Rectal cancer. POSTOPERATIVE DIAGNOSIS:  Rectal cancer. PROCEDURE PERFORMED:  Ultrasound-guided left internal jugular 8-Solomon Islander  Zehwms-D-Xyhg placement. SURGEON:  En Onofre MD    ASSISTANT:  Ms. Jenny Harris. SEDATION:  1. MAC.  2.  Local.    ESTIMATED BLOOD LOSS:  40 mL. SPECIMENS:  None. COMPLICATIONS:  None. INDICATIONS:  This is a 41-year-old female, who plans to undergo  chemotherapy for a advanced age rectal cancer. Risks and benefits of  Byzjai-R-Saic placement described. Risks of infection, bleeding, and  collapsed lung outlined. Port complications discussed. Despite the risks, she wishes to proceed. Consent obtained. OPERATIVE PROCEDURE:  She was taken to the operating room, placed in the  supine position. MAC anesthesia was administered. The neck and chest  were prepped and draped with a ChloraPrep-containing solution. Ancef  was given preoperatively. A time-out was taken for appropriate  verification. Using the ultrasound, the left internal jugular vein was identified. 0.25% Marcaine was injected for all local analgesia. A Seldinger needle  was inserted into the internal jugular vein. A guidewire was inserted  and confirmed in correct position with fluoroscopy. A port pocket was created on the left anterior chest wall to the  pectoralis fascia. An 8-Sol[place] Islander port was placed in the pocket and  sutured in two locations using interrupted 2-0 Vicryl suture.   The  catheter was tunneled subcutaneously to the

## 2023-05-11 NOTE — H&P
Department of General Surgery - Adult  Surgical Service general surgery  Attending admit note      CHIEF COMPLAINT: Rectal cancer    History Obtained From:  patient, electronic medical record    HISTORY OF PRESENT ILLNESS:                The patient is a 75 y.o. female who presents with large rectal cancer. She was diagnosed about 3 weeks ago. She has elected to proceed with chemotherapy. Risks and benefits of Yqnjbi-e-Tuar placement described. Risks of infection, bleeding, collapsed lung outlined. She understands the potential for port complications. She understands the need for Sibcht-r-Ifdl given her need for chemotherapy secondary to large fungating rectal cancer. All questions answered. Consent signed    Past Medical History:        Diagnosis Date    Asthma     Cancer ([facility] [address]     metastatic rectal     Past Surgical History:        Procedure Laterality Date     SECTION      DILATION AND CURETTAGE OF UTERUS      SIGMOIDOSCOPY N/A 2023    FLEXIBLE SIGMOIDOSCOPY performed by Kaiden Sotelo MD at [facility]     Current Medications:   Current Facility-Administered Medications: sodium chloride flush 0.9 % injection 5-40 mL, 5-40 mL, IntraVENous, 2 times per day  sodium chloride flush 0.9 % injection 5-40 mL, 5-40 mL, IntraVENous, PRN  0.9 % sodium chloride infusion, , IntraVENous, PRN  ceFAZolin (ANCEF) 2000 mg in 0.9% sodium chloride 100 mL IVPB, 2,000 mg, IntraVENous, On Call to OR  lactated ringers IV soln infusion, , IntraVENous, Continuous  Allergies:  Sulfa antibiotics    Social History:   TOBACCO:   reports that she has never smoked. She has never used smokeless tobacco.  ETOH:   reports that she does not currently use alcohol. DRUGS:   reports no history of drug use.   Family History:       Problem Relation Age of Onset    Coronary Art Dis Mother     Breast Cancer Sister     Breast Cancer Maternal Grandmother         bone       REVIEW OF SYSTEMS:    CONSTITUTIONAL:  negative  EYES:

## 2023-05-17 NOTE — CONSULTS
17 [facility]           Radiation Oncology      [address] [facility]        [address]: [phone]        F: [phone]       [email or web address]                   Dr. Shira Puentes MD PhD    CONSULT NOTE     Date of Service: 2023  Patient ID: Maco Vazquez   : 1955  MRN: [id number]   Acct Number: [de-identified]       Olu Hanks  75 y.o.   1955    REFERRING PROVIDER: Teresa Bonner    PCP:  Kimberly Davis DO    DIAGNOSIS:  1. Malignant neoplasm of anorectum ([facility] [address]    2. Colon cancer metastasized to multiple sites [facility])        STAGING: Cancer Staging   Malignant neoplasm of anorectum [facility])  Staging form: Colon And Rectum, AJCC 8th Edition  - Clinical: Stage IVB Moise Boles [facility], [place]) - Signed by Shira Puentes MD on 2023      HISTORY OF PRESENT ILLNESS: Ms. Maco Vazquez  is a 75y.o. year old female with history of diabetes mellitus, asthma, , D&C, and more recent diagnosis of metastatic adenocarcinoma of rectum primary. She has noted gradually worsening pain with bowel movements and diarrhea blood per rectum over the past several months. She also notes diminished caliber of stools and a 35 pound weight loss. She noted a diminished appetite and some occasional abdominal pain. CT of the abdomen and pelvis on 2023 revealed a 1.5 cm mass in the anorectal area with metastases in the right medical and bilateral lungs. Mass appeared to extend the and rectal junction into the perineum and gluteal folds dorsally. It was a 10 x 60 mm fluid density in the pancreatic body/tail concerning for metastases as well. On 2023 the patient underwent flexible sigmoidoscopy with biopsy which revealed adenocarcinoma distal rectum. The patient with medical oncology on 2023 with recommendation to do genetic sequencing on tissue and FOLFOX x6 every 2 weeks in addition to IV iron.   Referral was made for consideration of palliative radiation therapy to

## 2023-06-06 ENCOUNTER — HOSPITAL ENCOUNTER (OUTPATIENT)
Dept: RADIATION ONCOLOGY | Age: 68
Discharge: HOME | End: 2023-06-06
Payer: MEDICARE

## 2023-06-06 VITALS
BODY MASS INDEX: 17.05 KG/M2 | DIASTOLIC BLOOD PRESSURE: 71 MMHG | SYSTOLIC BLOOD PRESSURE: 135 MMHG | TEMPERATURE: 96.5 F | RESPIRATION RATE: 18 BRPM | HEART RATE: 84 BPM | OXYGEN SATURATION: 98 % | WEIGHT: 84.4 LBS

## 2023-06-06 DIAGNOSIS — C18.9 COLON CANCER METASTASIZED TO MULTIPLE SITES (HCC): ICD-10-CM

## 2023-06-06 DIAGNOSIS — C21.8 MALIGNANT NEOPLASM OF ANORECTUM (HCC): Primary | ICD-10-CM

## 2023-06-06 NOTE — PROGRESS NOTES
Nutrition Education    Educated on diet for poor appetite  Learners: Patient and family  Readiness: Acceptance  Method: Explanation  Response: Verbalizes Understanding  Contact name and number provided. Pt c/o poor appetite. Instructed on calorie and protein boosting and small frequent meals. Discussed several snack choices based on pts food preferences. Pt may benefit from appetite stimulant if unable to gain wt. Pt discussed with Dr Tawnya Foote and [address] [place] [address],[address] from Palliative care.     Eyal Gabriel RD, LD  Contact Number: [phone]

## 2023-06-06 NOTE — PROGRESS NOTES
NURSING ASSESSMENT     Date: 6/6/2023        Patient Name: Priscilla Vora     YOB: 1955      Age:  75 y.o. MRN: [id number]       Chaperone [x] Yes   [] No  -Nikos, son-Zan    Advance Directives:   Do you currently have completed advance directives (living will)? [] Yes   [x] No         *If yes, please bring us a copy for your records. *If no, would you like info or assistance in completing advance directives (living will)? [] Yes   [x] No has information at home    Pain Score:   Pain Score (1-10): none       Is pain affecting your ability to take care of yourself or move throughout your home? [] Yes   [x] No    General: Weight Loss and Fatigue  Patient has gained weight [] Yes   [x] No  Patient has lost weight [x] Yes   [] No  How much weight in pounds and over what length of time: pt has lost 7lb since early [id number]    Eyes (Ophthalmic): No Problem     Skin (Dermatological): pt using aquaphor to buttocks and anal area every day, skin intact     ENT: No Problems     Respiratory: BLACK     Cardiovascular: No Problems, left chest infusaport      Device   [] Yes   [x] No   Copy of Card Obtained [] Yes   [x] No    Gastrointestinal: pt states she moves her bowels regularly, last BM yesterday, pt denies blood or mucous in the stool, no pain with BM    Genito-Urinary: No Problems     Breast: No Problems     Musculoskeletal: No Problems    Neurological: Numbness and Tingling in fingers on occasion, occasional headache, every 2-3 weeks, dizziness if gets up too fast      Hematological and Lymphatic: No Problems     Endocrine: No Problems     Gyn History: denies any problems      A 10-point review of systems  has been conducted and pertinent positives have been   recorded. All other review of systems are negative    Was the patient admitted during the course of treatment OR within 30 days of treatment?  N/a    Additional Comments: Has chemo running via pump

## 2023-06-19 ENCOUNTER — OFFICE VISIT (OUTPATIENT)
Dept: PALLATIVE CARE | Age: 68
End: 2023-06-19
Payer: MEDICARE

## 2023-06-19 VITALS
RESPIRATION RATE: 18 BRPM | SYSTOLIC BLOOD PRESSURE: 117 MMHG | DIASTOLIC BLOOD PRESSURE: 66 MMHG | TEMPERATURE: 97.1 F | OXYGEN SATURATION: 95 % | HEART RATE: 92 BPM | BODY MASS INDEX: 16.4 KG/M2 | WEIGHT: 81.2 LBS

## 2023-06-19 DIAGNOSIS — Z51.5 PALLIATIVE CARE ENCOUNTER: ICD-10-CM

## 2023-06-19 DIAGNOSIS — R63.4 UNINTENTIONAL WEIGHT LOSS: ICD-10-CM

## 2023-06-19 DIAGNOSIS — C20 RECTAL CANCER (HCC): Primary | ICD-10-CM

## 2023-06-19 DIAGNOSIS — Z71.89 GOALS OF CARE, COUNSELING/DISCUSSION: ICD-10-CM

## 2023-06-19 DIAGNOSIS — G89.3 CANCER RELATED PAIN: ICD-10-CM

## 2023-06-19 PROCEDURE — 99215 OFFICE O/P EST HI 40 MIN: CPT

## 2023-06-19 PROCEDURE — G9692 HOSP RECD BY PT DUR MSMT PER: HCPCS

## 2023-06-19 RX ORDER — PROCHLORPERAZINE MALEATE 10 MG
10 TABLET ORAL EVERY 6 HOURS PRN
COMMUNITY
Start: 2023-05-15

## 2023-06-19 ASSESSMENT — ENCOUNTER SYMPTOMS
CHEST TIGHTNESS: 0
COLOR CHANGE: 0
TROUBLE SWALLOWING: 0
VOICE CHANGE: 0
SHORTNESS OF BREATH: 0
VOMITING: 0
NAUSEA: 1
BACK PAIN: 0
RECTAL PAIN: 1
ABDOMINAL PAIN: 0
BLOOD IN STOOL: 1
COUGH: 0

## 2023-06-19 NOTE — PATIENT INSTRUCTIONS
You have lost 10lbs in one month. Incorporate 3 ensures with meals a day  Frequent small meals throughout the day  Will reconsider appetite stimulant next visit.

## 2023-06-19 NOTE — PROGRESS NOTES
resistant to appetite suppressant. States will incorporate a third ensure today with a meal. She is unsure if she has seen Dietitian recently. Mood: Reports intermittent \"agitation\" that has been baseline. She denies any need for medication pertaining to mood disturbances. Denies anxiety, depression, SI/HI. Sleep: Reports only getting 4 hours to sleep, intermittent sleep disturbances that have been baseline. GI/: Denies any recent constipation, reports that her bowels are moving regularly. I have personally reviewed the patient's most recent and available provider notes, lab work and imaging.       Past Medical History:   Diagnosis Date    Asthma     Cancer [facility])     metastatic rectal     Past Surgical History:   Procedure Laterality Date     SECTION      DILATION AND CURETTAGE OF UTERUS      PORT SURGERY Left 2023    left infusaport placement performed by Chad Courtney MD at [address] [place] [address] N/A 2023    FLEXIBLE SIGMOIDOSCOPY performed by Chad Courtney MD at [facility] W/ SUBCUTANEOUS PORT Left 2023    [employer] [address] Implantable Port     Social History     Socioeconomic History    Marital status:      Spouse name: Not on file    Number of children: Not on file    Years of education: Not on file    Highest education level: Not on file   Occupational History    Not on file   Tobacco Use    Smoking status: Never    Smokeless tobacco: Never   Vaping Use    Vaping Use: Never used   Substance and Sexual Activity    Alcohol use: Not Currently    Drug use: Never    Sexual activity: Yes     Partners: Male   Other Topics Concern    Not on file   Social History Narrative    Not on file     Social Determinants of Health     Financial Resource Strain: Not on file   Food Insecurity: Not on file   Transportation Needs: Not on file   Physical Activity: Not on file   Stress: Not on file   Social Connections: Not on file   Intimate

## 2023-06-20 ENCOUNTER — TELEPHONE (OUTPATIENT)
Dept: ENDOCRINOLOGY | Age: 68
End: 2023-06-20

## 2023-06-20 ENCOUNTER — OFFICE VISIT (OUTPATIENT)
Dept: ENDOCRINOLOGY | Age: 68
End: 2023-06-20

## 2023-06-20 VITALS
HEART RATE: 73 BPM | HEIGHT: 59 IN | OXYGEN SATURATION: 97 % | BODY MASS INDEX: 16.13 KG/M2 | WEIGHT: 80 LBS | SYSTOLIC BLOOD PRESSURE: 95 MMHG | DIASTOLIC BLOOD PRESSURE: 67 MMHG

## 2023-06-20 DIAGNOSIS — E11.69 TYPE 2 DIABETES MELLITUS WITH OTHER SPECIFIED COMPLICATION, WITHOUT LONG-TERM CURRENT USE OF INSULIN (HCC): ICD-10-CM

## 2023-06-20 DIAGNOSIS — E11.69 TYPE 2 DIABETES MELLITUS WITH OTHER SPECIFIED COMPLICATION, WITHOUT LONG-TERM CURRENT USE OF INSULIN (HCC): Primary | ICD-10-CM

## 2023-06-20 LAB
ANION GAP SERPL CALCULATED.3IONS-SCNC: 14 MEQ/L (ref 9–15)
BUN SERPL-MCNC: 12 MG/DL (ref 8–23)
CALCIUM SERPL-MCNC: 9.2 MG/DL (ref 8.5–9.9)
CHLORIDE SERPL-SCNC: 93 MEQ/L (ref 95–107)
CHP ED QC CHECK: NORMAL
CO2 SERPL-SCNC: 22 MEQ/L (ref 20–31)
CREAT SERPL-MCNC: 0.59 MG/DL (ref 0.5–0.9)
GLUCOSE BLD-MCNC: NORMAL MG/DL
GLUCOSE SERPL-MCNC: 558 MG/DL (ref 70–99)
POTASSIUM SERPL-SCNC: 3.8 MEQ/L (ref 3.4–4.9)
SODIUM SERPL-SCNC: 129 MEQ/L (ref 135–144)

## 2023-06-20 RX ORDER — LANCETS 30 GAUGE
1 EACH MISCELLANEOUS DAILY
Qty: 100 EACH | Refills: 3 | Status: SHIPPED | OUTPATIENT
Start: 2023-06-20

## 2023-06-20 RX ORDER — GLUCOSAMINE HCL/CHONDROITIN SU 500-400 MG
CAPSULE ORAL
Qty: 100 STRIP | Refills: 3 | Status: SHIPPED | OUTPATIENT
Start: 2023-06-20

## 2023-06-20 RX ORDER — INSULIN LISPRO 100 [IU]/ML
INJECTION, SUSPENSION SUBCUTANEOUS
Qty: 10 ADJUSTABLE DOSE PRE-FILLED PEN SYRINGE | Refills: 3 | Status: SHIPPED | OUTPATIENT
Start: 2023-06-20

## 2023-06-20 ASSESSMENT — ENCOUNTER SYMPTOMS
NAUSEA: 0
VISUAL CHANGE: 0

## 2023-06-20 NOTE — PROGRESS NOTES
Relation Age of Onset    Coronary Art Dis Mother     Breast Cancer Sister     Breast Cancer Maternal Grandmother         bone     Allergies   Allergen Reactions    Sulfa Antibiotics Hives       Current Outpatient Medications:     acetaminophen (TYLENOL) 500 MG tablet, Take 1 tablet by mouth every 6 hours as needed for Pain, Disp: , Rfl:     prochlorperazine (COMPAZINE) 10 MG tablet, Take 1 tablet by mouth every 6 hours as needed for nausea (Patient not taking: Reported on 6/20/2023), Disp: , Rfl:     metFORMIN (GLUCOPHAGE) 500 MG tablet, Take 1 tablet by mouth daily (with breakfast) (Patient not taking: Reported on 4/28/2023), Disp: 30 tablet, Rfl: 0    ALBUTEROL IN, Inhale 2 puffs into the lungs as needed (Patient not taking: Reported on 4/28/2023), Disp: , Rfl:   Lab Results   Component Value Date     04/18/2023    K 3.5 04/18/2023     04/18/2023    CO2 26 04/18/2023    BUN 5 (L) 04/18/2023    CREATININE 0.41 (L) 04/18/2023    GLUCOSE  06/20/2023      Comment:      high over 600    CALCIUM 8.2 (L) 04/18/2023    PROT 5.1 (L) 04/18/2023    LABALBU 2.4 (L) 04/18/2023    BILITOT 0.4 04/18/2023    ALKPHOS 134 (H) 04/18/2023    AST 24 04/18/2023    ALT 14 04/18/2023    LABGLOM >60.0 04/18/2023    GLOB 3.3 04/16/2023     Lab Results   Component Value Date    WBC 16.0 (H) 04/18/2023    HGB 12.0 04/18/2023    HCT 37.2 04/18/2023    MCV 87.5 04/18/2023     (H) 04/18/2023     Lab Results   Component Value Date    LABA1C 14.3 (H) 04/16/2023         Review of Systems   Constitutional:  Positive for fatigue, unexpected weight change and weight loss. Gastrointestinal:  Negative for nausea. Recent rectal bleed diagnosis of colon cancer   Endocrine: Positive for polydipsia and polyuria. All other systems reviewed and are negative. Objective:   Physical Exam  Vitals reviewed. Constitutional:       General: She is not in acute distress. Appearance: Normal appearance.       Comments: Underweight

## 2023-06-21 LAB — C PEPTIDE SERPL-MCNC: 2.9 NG/ML (ref 1.1–4.4)

## 2023-06-22 LAB — GAD65 AB SER IA-ACNC: <5 IU/ML (ref 0–5)

## 2023-07-05 NOTE — PROGRESS NOTES
[facility]           Radiation Oncology      [address] [facility], [address] [facility] Part: [phone]        F: [phone]       [employer]                   Dr. Sadaf Iqbal MD PhD    FOLLOW-UP NOTE     Date of Service: 2023  Patient ID: Dion Garcia   : 1955  MRN: [id number]   Acct Number: [de-identified]       NAME:  Dion Garcia    :  1955 75 y.o. female     PCP: Cleopatra Santos DO    REFERRING PROVIDER: Yue Members    DIAGNOSIS:  1. Malignant neoplasm of anorectum ([address] [facility])    2. Colon cancer metastasized to multiple sites [facility])        STAGING: Cancer Staging   Malignant neoplasm of anorectum [facility])  Staging form: Colon And Rectum, AJCC 8th Edition  - Clinical: Stage IVB [facility] [place]) - Signed by Sadaf Iqbal MD on 2023      PRIOR TREATMENT: 2500 cGy in 5 fractions    TIME SINCE TREATMENT:  1 month    RECENT HISTORY: Dion Garcia returns for quick check approximately 1 month status post completion of aggressive palliative radiation for the above diagnosis. She notes that her pain has significantly improved and she is able to have relatively normal bowel movements. She is also able to sit down for prolonged periods of time without difficulty. She has had a left CW mediport placed and has started FOLFOX and is tolerating it well. Past medical, surgical, social and family histories reviewed and updated as indicated. ALLERGIES:  Sulfa antibiotics       MEDICATIONS:   Current Outpatient Medications:     blood glucose monitor kit and supplies, Dispense one meter that insurance will cover. , Disp: 1 kit, Rfl: 0    blood glucose monitor strips, Test 3x daily e11.65, Disp: 100 strip, Rfl: 3    Lancets MISC, 1 each by Does not apply route daily, Disp: 100 each, Rfl: 3    insulin lispro protamine & lispro (HUMALOG MIX 75/25 KWIKPEN) (75-25) 100 UNIT per ML SUPN injection pen, 16 units twice a day, Disp: 10 Adjustable Dose Pre-filled Pen

## 2023-07-10 ENCOUNTER — OFFICE VISIT (OUTPATIENT)
Dept: PALLATIVE CARE | Age: 68
End: 2023-07-10
Payer: MEDICARE

## 2023-07-10 VITALS
RESPIRATION RATE: 18 BRPM | BODY MASS INDEX: 16.12 KG/M2 | DIASTOLIC BLOOD PRESSURE: 60 MMHG | OXYGEN SATURATION: 94 % | SYSTOLIC BLOOD PRESSURE: 100 MMHG | WEIGHT: 79.8 LBS | TEMPERATURE: 97.4 F | HEART RATE: 94 BPM

## 2023-07-10 DIAGNOSIS — Z51.5 PALLIATIVE CARE ENCOUNTER: ICD-10-CM

## 2023-07-10 DIAGNOSIS — F33.9 EPISODE OF RECURRENT MAJOR DEPRESSIVE DISORDER, UNSPECIFIED DEPRESSION EPISODE SEVERITY (HCC): ICD-10-CM

## 2023-07-10 DIAGNOSIS — G89.3 CANCER RELATED PAIN: ICD-10-CM

## 2023-07-10 DIAGNOSIS — Z71.89 GOALS OF CARE, COUNSELING/DISCUSSION: ICD-10-CM

## 2023-07-10 DIAGNOSIS — R63.4 UNINTENTIONAL WEIGHT LOSS: Primary | ICD-10-CM

## 2023-07-10 DIAGNOSIS — C20 RECTAL CANCER (HCC): ICD-10-CM

## 2023-07-10 DIAGNOSIS — E11.69 TYPE 2 DIABETES MELLITUS WITH OTHER SPECIFIED COMPLICATION, WITHOUT LONG-TERM CURRENT USE OF INSULIN (HCC): Primary | ICD-10-CM

## 2023-07-10 PROCEDURE — G9692 HOSP RECD BY PT DUR MSMT PER: HCPCS

## 2023-07-10 PROCEDURE — 99215 OFFICE O/P EST HI 40 MIN: CPT

## 2023-07-10 RX ORDER — MIRTAZAPINE 7.5 MG/1
7.5 TABLET, FILM COATED ORAL NIGHTLY
Qty: 14 TABLET | Refills: 0 | Status: SHIPPED | OUTPATIENT
Start: 2023-07-10 | End: 2023-07-24

## 2023-07-10 ASSESSMENT — ENCOUNTER SYMPTOMS
ABDOMINAL PAIN: 0
VOICE CHANGE: 0
BACK PAIN: 0
COLOR CHANGE: 0
SHORTNESS OF BREATH: 0
CHEST TIGHTNESS: 0
COUGH: 0
VOMITING: 0
TROUBLE SWALLOWING: 0

## 2023-07-17 ASSESSMENT — ENCOUNTER SYMPTOMS
BLOOD IN STOOL: 0
RECTAL PAIN: 0
NAUSEA: 0

## 2023-10-02 ENCOUNTER — TELEPHONE (OUTPATIENT)
Dept: INTERVENTIONAL RADIOLOGY/VASCULAR | Age: 68
End: 2023-10-02

## 2023-10-02 DIAGNOSIS — C21.8 MALIGNANT NEOPLASM OF ANORECTUM (HCC): Primary | ICD-10-CM

## 2023-10-02 RX ORDER — POTASSIUM CHLORIDE 750 MG/1
10 TABLET, FILM COATED, EXTENDED RELEASE ORAL DAILY
COMMUNITY
Start: 2023-07-05

## 2023-10-02 NOTE — TELEPHONE ENCOUNTER
Martin was given this information prior to patient left their office   Spoke to Spike in [address]: 10/6/2023 @ 2:00 pm   >  You will need to arrive at 1:30 pm from home and check in at the Diagnostic Imaging Check In desk.

## 2023-10-04 ENCOUNTER — PRE-PROCEDURE TELEPHONE (OUTPATIENT)
Dept: INTERVENTIONAL RADIOLOGY/VASCULAR | Age: 68
End: 2023-10-04

## 2023-10-04 ENCOUNTER — TELEPHONE (OUTPATIENT)
Dept: INTERVENTIONAL RADIOLOGY/VASCULAR | Age: 68
End: 2023-10-04

## 2023-10-04 NOTE — FLOWSHEET NOTE
1500 spoke with pt, appt changed from 10/6 at 1400 to 10/5 at 1300. Pt agrees and will arrive at radiology desk by [phone].

## 2023-10-04 NOTE — TELEPHONE ENCOUNTER
SPOKE [address] [facility]- WITH A MESSAGE FOR US; STATING SHE DID GET IN TOUCH WITH PATIENT ([address]) CONFIRMING APPOINTMENT FOR FRIDAY 10/06/23 ARRIVING AT 1PM FOR PROCEDURE TO START @ 2PM.

## 2023-10-05 ENCOUNTER — HOSPITAL ENCOUNTER (OUTPATIENT)
Dept: INTERVENTIONAL RADIOLOGY/VASCULAR | Age: 68
Discharge: HOME | End: 2023-10-07
Payer: MEDICARE

## 2023-10-05 DIAGNOSIS — C21.8 MALIGNANT NEOPLASM OF ANORECTUM (HCC): ICD-10-CM

## 2023-10-05 DIAGNOSIS — C21.8 MALIGNANT NEOPLASM OF ANORECTUM (HCC): Primary | ICD-10-CM

## 2023-10-05 PROCEDURE — 2709999900 IR CONTRAST INJECTION  EXISTING CV ACCESS DEVICE EVALUATION W FLUORO

## 2023-10-05 PROCEDURE — 36598 INJ W/FLUOR EVAL CV DEVICE: CPT

## 2023-10-05 PROCEDURE — 6360000002 HC RX W HCPCS: Performed by: RADIOLOGY

## 2023-10-05 PROCEDURE — 6360000004 HC RX CONTRAST MEDICATION: Performed by: RADIOLOGY

## 2023-10-05 RX ORDER — HEPARIN 100 UNIT/ML
SYRINGE INTRAVENOUS PRN
Status: COMPLETED | OUTPATIENT
Start: 2023-10-05 | End: 2023-10-05

## 2023-10-05 RX ADMIN — IOPAMIDOL 10 ML: 612 INJECTION, SOLUTION INTRAVENOUS at 13:15

## 2023-10-05 RX ADMIN — HEPARIN 5 ML: 100 SYRINGE at 13:16

## 2023-10-05 NOTE — OR NURSING
NO SEDATION    Patient ambulated, gait steady, to [employer]. Pt S&Ox4, respirations even and unlabored, and existing left chest mediport. Procedure explained. Consent signed. No allergy to IV contrast dye. Pt assisted onto procedural table in supine position. Port accessed with Arctrieval Needle 20g x 1in. Unable to aspirate blood. Fluoro image obtained and catheter appears to have migrated out of position. Dr. Glenroy Herron injected 10cc contrast and visualized with fluoro guidance, see eMar.    Port placement not in SVC. Port would need to be removed and new port placed. Port flushed with 10cc of normal saline then instilled with 5cc Heparinized saline and Port deaccessed by Dr. Glenroy Herron. Patient instructed to speak to referring provider, Raina Ramirez NP with hemoc/oncology regarding next step of referral for removal and new placement. Pt verbalized understanding. Pt tolerated all well. Pt discharged with belongings to lobby via ambulation, gait steady, and spouse updated as well by LR-RN. Electronically signed by Carli Haney RN on 10/5/2023 at 1:18 PM

## 2023-10-06 ENCOUNTER — TELEPHONE (OUTPATIENT)
Dept: INTERVENTIONAL RADIOLOGY/VASCULAR | Age: 68
End: 2023-10-06

## 2023-10-06 NOTE — TELEPHONE ENCOUNTER
Patient was given this information via phone conversation - voiced understanding  2.   Spoke to Yvrose Rodriguez in [address]: 10/13/2023 @ 1:00 pm   >  You will need to arrive at 11:30 am from home and check in at the Diagnostic Imaging Check In desk.   >  Do not eat or drink after midnight.    >  Make arrangements for transportation, as you should not drive immediately after.  >  Patient to have PT/INR, BMP and CBC drawn anytime prior to procedure

## 2023-10-12 ENCOUNTER — PRE-PROCEDURE TELEPHONE (OUTPATIENT)
Dept: INTERVENTIONAL RADIOLOGY/VASCULAR | Age: 68
End: 2023-10-12

## 2023-10-12 DIAGNOSIS — C21.8 MALIGNANT NEOPLASM OF ANORECTUM (HCC): ICD-10-CM

## 2023-10-12 LAB
ANION GAP SERPL CALCULATED.3IONS-SCNC: 13 MEQ/L (ref 9–15)
BUN SERPL-MCNC: 8 MG/DL (ref 8–23)
CALCIUM SERPL-MCNC: 9.4 MG/DL (ref 8.5–9.9)
CHLORIDE SERPL-SCNC: 101 MEQ/L (ref 95–107)
CO2 SERPL-SCNC: 24 MEQ/L (ref 20–31)
CREAT SERPL-MCNC: 0.54 MG/DL (ref 0.5–0.9)
ERYTHROCYTE [DISTWIDTH] IN BLOOD BY AUTOMATED COUNT: 16 % (ref 11.5–14.5)
GLUCOSE SERPL-MCNC: 264 MG/DL (ref 70–99)
HCT VFR BLD AUTO: 35.4 % (ref 37–47)
HGB BLD-MCNC: 12.1 G/DL (ref 12–16)
INR PPP: 1.1
MCH RBC QN AUTO: 35.2 PG (ref 27–31.3)
MCHC RBC AUTO-ENTMCNC: 34.2 % (ref 33–37)
MCV RBC AUTO: 102.9 FL (ref 79.4–94.8)
PLATELET # BLD AUTO: 177 K/UL (ref 130–400)
POTASSIUM SERPL-SCNC: 3.3 MEQ/L (ref 3.4–4.9)
PROTHROMBIN TIME: 13.9 SEC (ref 12.3–14.9)
RBC # BLD AUTO: 3.44 M/UL (ref 4.2–5.4)
SODIUM SERPL-SCNC: 138 MEQ/L (ref 135–144)
WBC # BLD AUTO: 4.9 K/UL (ref 4.8–10.8)

## 2023-10-12 NOTE — FLOWSHEET NOTE
Called patient to remind her to pre-procedure lab work drawn as ordered, no answer. Voicemail message left with the following information     >  YOUR PROCEDURE IS SCHEDULED ON: 10/13/2023 @ 1:00 pm   >  You will need to arrive at 11:30 am from home and check in at the Diagnostic Imaging Check In desk.   >  Do not eat or drink after midnight.    >  Make arrangements for transportation, as you should not drive immediately after.  >  Please have ordered labs drawn prior to procedure     Radiology phone number left for call back with any issues / concerns.  Electronically signed by Snow Garcia RN on 10/12/2023 at 1:18 PM

## 2023-10-13 ENCOUNTER — ANESTHESIA EVENT (OUTPATIENT)
Dept: INTERVENTIONAL RADIOLOGY/VASCULAR | Age: 68
End: 2023-10-13
Payer: MEDICARE

## 2023-10-13 ENCOUNTER — HOSPITAL ENCOUNTER (OUTPATIENT)
Dept: INTERVENTIONAL RADIOLOGY/VASCULAR | Age: 68
End: 2023-10-13
Payer: MEDICARE

## 2023-10-13 ENCOUNTER — HOSPITAL ENCOUNTER (OUTPATIENT)
Dept: INTERVENTIONAL RADIOLOGY/VASCULAR | Age: 68
Discharge: HOME | End: 2023-10-13
Payer: MEDICARE

## 2023-10-13 ENCOUNTER — ANESTHESIA (OUTPATIENT)
Dept: INTERVENTIONAL RADIOLOGY/VASCULAR | Age: 68
End: 2023-10-13
Payer: MEDICARE

## 2023-10-13 VITALS
DIASTOLIC BLOOD PRESSURE: 77 MMHG | RESPIRATION RATE: 24 BRPM | BODY MASS INDEX: 18.18 KG/M2 | OXYGEN SATURATION: 99 % | WEIGHT: 90 LBS | HEART RATE: 94 BPM | SYSTOLIC BLOOD PRESSURE: 161 MMHG

## 2023-10-13 DIAGNOSIS — C21.8 MALIGNANT NEOPLASM OF ANORECTUM (HCC): ICD-10-CM

## 2023-10-13 LAB — POTASSIUM SERPL-SCNC: 3.2 MEQ/L (ref 3.4–4.9)

## 2023-10-13 PROCEDURE — 3700000000 HC ANESTHESIA ATTENDED CARE: Performed by: RADIOLOGY

## 2023-10-13 PROCEDURE — 36590 REMOVAL TUNNELED CV CATH: CPT | Performed by: RADIOLOGY

## 2023-10-13 PROCEDURE — 77001 FLUOROGUIDE FOR VEIN DEVICE: CPT

## 2023-10-13 PROCEDURE — 6360000002 HC RX W HCPCS: Performed by: RADIOLOGY

## 2023-10-13 PROCEDURE — 2580000003 HC RX 258: Performed by: NURSE PRACTITIONER

## 2023-10-13 PROCEDURE — 2709999900 IR FLUORO GUIDED CVA DEVICE PLMT/REPLACE/REMOVAL

## 2023-10-13 PROCEDURE — 3700000001 HC ADD 15 MINUTES (ANESTHESIA): Performed by: RADIOLOGY

## 2023-10-13 PROCEDURE — 2500000003 HC RX 250 WO HCPCS: Performed by: RADIOLOGY

## 2023-10-13 PROCEDURE — 6370000000 HC RX 637 (ALT 250 FOR IP): Performed by: NURSE PRACTITIONER

## 2023-10-13 PROCEDURE — 2709999900 IR REMOVE TUNNELED CVAD W SQ PORT/PUMP INSERT

## 2023-10-13 PROCEDURE — 36590 REMOVAL TUNNELED CV CATH: CPT

## 2023-10-13 PROCEDURE — 6360000002 HC RX W HCPCS: Performed by: NURSE PRACTITIONER

## 2023-10-13 PROCEDURE — 76937 US GUIDE VASCULAR ACCESS: CPT | Performed by: RADIOLOGY

## 2023-10-13 PROCEDURE — 36561 INSERT TUNNELED CV CATH: CPT

## 2023-10-13 PROCEDURE — 36561 INSERT TUNNELED CV CATH: CPT | Performed by: RADIOLOGY

## 2023-10-13 PROCEDURE — 76937 US GUIDE VASCULAR ACCESS: CPT

## 2023-10-13 PROCEDURE — 84132 ASSAY OF SERUM POTASSIUM: CPT

## 2023-10-13 PROCEDURE — 6360000002 HC RX W HCPCS: Performed by: REGISTERED NURSE

## 2023-10-13 RX ORDER — PROPOFOL 10 MG/ML
INJECTION, EMULSION INTRAVENOUS CONTINUOUS PRN
Status: DISCONTINUED | OUTPATIENT
Start: 2023-10-13 | End: 2023-10-13 | Stop reason: SDUPTHER

## 2023-10-13 RX ORDER — ONDANSETRON 2 MG/ML
4 INJECTION INTRAMUSCULAR; INTRAVENOUS
OUTPATIENT
Start: 2023-10-13 | End: 2023-10-14

## 2023-10-13 RX ORDER — SODIUM CHLORIDE 0.9 % (FLUSH) 0.9 %
5-40 SYRINGE (ML) INJECTION EVERY 12 HOURS SCHEDULED
OUTPATIENT
Start: 2023-10-13

## 2023-10-13 RX ORDER — METOCLOPRAMIDE HYDROCHLORIDE 5 MG/ML
10 INJECTION INTRAMUSCULAR; INTRAVENOUS
OUTPATIENT
Start: 2023-10-13 | End: 2023-10-14

## 2023-10-13 RX ORDER — HEPARIN 100 UNIT/ML
SYRINGE INTRAVENOUS PRN
Status: COMPLETED | OUTPATIENT
Start: 2023-10-13 | End: 2023-10-13

## 2023-10-13 RX ORDER — LABETALOL HYDROCHLORIDE 5 MG/ML
2.5 INJECTION, SOLUTION INTRAVENOUS ONCE
Status: COMPLETED | OUTPATIENT
Start: 2023-10-13 | End: 2023-10-13

## 2023-10-13 RX ORDER — FENTANYL CITRATE 0.05 MG/ML
50 INJECTION, SOLUTION INTRAMUSCULAR; INTRAVENOUS EVERY 10 MIN PRN
OUTPATIENT
Start: 2023-10-13

## 2023-10-13 RX ORDER — LIDOCAINE HYDROCHLORIDE AND EPINEPHRINE BITARTRATE 20; .01 MG/ML; MG/ML
INJECTION, SOLUTION SUBCUTANEOUS PRN
Status: COMPLETED | OUTPATIENT
Start: 2023-10-13 | End: 2023-10-13

## 2023-10-13 RX ORDER — ESMOLOL HYDROCHLORIDE 10 MG/ML
INJECTION INTRAVENOUS PRN
Status: DISCONTINUED | OUTPATIENT
Start: 2023-10-13 | End: 2023-10-13 | Stop reason: SDUPTHER

## 2023-10-13 RX ORDER — LIDOCAINE HYDROCHLORIDE 10 MG/ML
1 INJECTION, SOLUTION EPIDURAL; INFILTRATION; INTRACAUDAL; PERINEURAL
OUTPATIENT
Start: 2023-10-13 | End: 2023-10-14

## 2023-10-13 RX ORDER — POTASSIUM CHLORIDE 20 MEQ/1
20 TABLET, EXTENDED RELEASE ORAL ONCE
Status: COMPLETED | OUTPATIENT
Start: 2023-10-13 | End: 2023-10-13

## 2023-10-13 RX ORDER — SODIUM CHLORIDE 9 MG/ML
INJECTION, SOLUTION INTRAVENOUS PRN
OUTPATIENT
Start: 2023-10-13

## 2023-10-13 RX ORDER — DIPHENHYDRAMINE HYDROCHLORIDE 50 MG/ML
12.5 INJECTION INTRAMUSCULAR; INTRAVENOUS
OUTPATIENT
Start: 2023-10-13 | End: 2023-10-14

## 2023-10-13 RX ORDER — OXYCODONE HYDROCHLORIDE 5 MG/1
5 TABLET ORAL
OUTPATIENT
Start: 2023-10-13 | End: 2023-10-14

## 2023-10-13 RX ORDER — SODIUM CHLORIDE 0.9 % (FLUSH) 0.9 %
5-40 SYRINGE (ML) INJECTION PRN
OUTPATIENT
Start: 2023-10-13

## 2023-10-13 RX ORDER — MEPERIDINE HYDROCHLORIDE 25 MG/ML
12.5 INJECTION INTRAMUSCULAR; INTRAVENOUS; SUBCUTANEOUS
OUTPATIENT
Start: 2023-10-13 | End: 2023-10-14

## 2023-10-13 RX ADMIN — PROPOFOL 60 MG: 10 INJECTION, EMULSION INTRAVENOUS at 13:28

## 2023-10-13 RX ADMIN — CEFAZOLIN 1000 MG: 1 INJECTION, POWDER, FOR SOLUTION INTRAMUSCULAR; INTRAVENOUS at 12:43

## 2023-10-13 RX ADMIN — LABETALOL HYDROCHLORIDE 2.5 MG: 5 INJECTION, SOLUTION INTRAVENOUS at 16:33

## 2023-10-13 RX ADMIN — PROPOFOL 20 MG: 10 INJECTION, EMULSION INTRAVENOUS at 13:46

## 2023-10-13 RX ADMIN — LIDOCAINE HYDROCHLORIDE,EPINEPHRINE BITARTRATE 17 ML: 20; .01 INJECTION, SOLUTION INFILTRATION; PERINEURAL at 13:46

## 2023-10-13 RX ADMIN — PROPOFOL 80 MCG/KG/MIN: 10 INJECTION, EMULSION INTRAVENOUS at 13:29

## 2023-10-13 RX ADMIN — LIDOCAINE HYDROCHLORIDE,EPINEPHRINE BITARTRATE 35 ML: 20; .01 INJECTION, SOLUTION INFILTRATION; PERINEURAL at 14:17

## 2023-10-13 RX ADMIN — PROPOFOL 30 MG: 10 INJECTION, EMULSION INTRAVENOUS at 13:45

## 2023-10-13 RX ADMIN — POTASSIUM CHLORIDE 20 MEQ: 1500 TABLET, EXTENDED RELEASE ORAL at 16:33

## 2023-10-13 RX ADMIN — ESMOLOL HYDROCHLORIDE 10 MG: 100 INJECTION, SOLUTION INTRAVENOUS at 15:28

## 2023-10-13 RX ADMIN — HEPARIN 5 ML: 100 SYRINGE at 14:32

## 2023-10-13 NOTE — OR NURSING
Sedation Administered via ANESTHESIA     Staff awaiting pt in OR 8. Pt brought to OR 8 via cart. Pt A&Ox4, respirations even and unlabored, skin WNL, and existing port on left chest. Pt transferred with staff assistance x 3 onto procedure table in supine position. Anesthesia connected pt to monitors and face mask with O2 and CRNA will monitor VS. Consent confirmed for left chest mediport removal and right chest insertion of mediport. Fluoro image obtained by AR-tech to show existing placement. Pre sedation timeout complete. Sedation started. LEFT IJ TUNNELED MEDIPORT REMOVAL:  JS-tech prepped Left neck and chest with large tinted chloraprep. Once dry, AR-tech draped with full body drape using sterile technique. Procedural timeout completed for left mediport removal followed by right mediport insertion. Dr. Tram Antoine numbed site of existing mediport with 2% lidocaine with epinephrine, see eMar.     Dr Tram Antoine made Left chest incision in skin at the proximal top end of mediport and tissue gently dissected from the Sedalia Cecy. 1352: Mediport removed gently per Dr Tram Antoine. Manual pressure started by AR-tech. Mediport intact, length 22 cm. Dr Tram Antoine irrigating the pocket with antibiotic solution and then drying with dry sterile gauze. Pocket sutured close with vicryl 4-0. Manual pressure released, no oozing noted at site. Topical skin affix applied to the Left chest incision site. Post image obtained. Second coat of skin affix applied. Left mediport removal complete. 1406: Pt and room readied for right mediport insertion at this time. RIGHT IJ TUNNELED MEDIPORT INSERTION:  JS-tech scanned right IJ to assess patency of vein for mediport placement, site approved by Dr. Tram Antoine. AR-tech prepped right neck and chest with large tinted chloraprep. Once dry, JS-tech cover site using full body drape in sterile fashion.     Using ultrasound guidance, Dr. Tram Antoine

## 2023-10-13 NOTE — PROGRESS NOTES
Pt arrive to CT holding post procedure. 1459 Heart rate 134. Pt placed on bedpan to urinate. Glued site     [address] and cart-side watching monitor and is ok with heart rate, rhythm,and BP. Pt denies pain. 5 Pt removed from bedpan an shania-care provided. 1401 [address] and cart-side watching monitor and is ok with heart rate, rhythm,and BP. See flow sheet. Pt denies pain. Pt placed on bedpan to urinate. 1525 Pt removed from bedpan an shania-care provided. 1526 Anesthesiologist at [facility] to medicate pt with esmolol, beta blocker. 1528 See MAR for medication administration by [facility]. Pts VSS. Pt on bedpan again to urinate. Pt denies chest pain or shortness of breath. 1532 Pt removed from bedpan an shania-care provided. 56 Dr Neal Merlin present at [facility] assessing pt, vitals, and reviewing chart. Adrian Parham at [facility] checking on pt.    1550 Shania care provided and linen's changed. Pt placed on bedpan to urinate. Pt denies chest pain or shortness of breath. [address] at [facility]. Pts  at [facility]. 1605 Pt removed from bedpan an shania-care provided. 1405 [facility] with Dr Neal Merlin wo stats to keep pt until 1700. If heart rate and BP stable, pt can go home. Pt needs to follow up with PCP.     1633 Labetalol 2.5 mg IV and potassium 20 meq PO given per order. See order and see MAR. Pt tolerated well. Pt ate apple sauce and drank water and tolerated well. 1641 Pt placed on bedpan to urinate. [id number] Discharge instructions reviewed including mediportwith pt and pt voices understanding. Pt removed from bedpan an shania-care provided. 1702 Pts VS. Pt denies chest pain or shortness of breath. Pt denies any pain. 1705 IV removed. Pt tolerated well.     1707 Pt taken via wheel chair to bathroom to urinate. 26 Pt changing into street clothes with minimal assistance by nurse.      1717 Pt taken via wheel

## 2023-10-13 NOTE — FLOWSHEET NOTE
0 - Dr. Marcelo Malone at cart side to evaluate patient and sign consent. 1314 - K redraw not yet resulted. Dr. Laura Jaffe notified - he is ok with 3.3 from yesterday's lab draw. OK to bring patient to OR.     1315 - To OR via cart.  Electronically signed by Osbaldo Vega RN on 10/13/2023 at 1:15 PM

## 2023-10-13 NOTE — ANESTHESIA PRE PROCEDURE
Department of Anesthesiology  Preprocedure Note       Name:  Osbaldo Mask   Age:  70 y.o.  :  1955                                          MRN:  [id number]         Date:  10/13/2023      Surgeon: * No surgeons listed *    Procedure: * No procedures listed *    Medications prior to admission:   Prior to Admission medications    Medication Sig Start Date End Date Taking? Authorizing Provider   potassium chloride (KLOR-CON) 10 MEQ extended release tablet Take 1 tablet by mouth daily 23   Debbi De La Garza MD   mirtazapine (REMERON) 7.5 MG tablet Take 1 tablet by mouth nightly for 14 days 7/10/23 7/24/23  Scott Welch, APRN - CNP   insulin aspart protamine-insulin aspart (NOVOLOG 70/30) injection pen Inject 16 units twice daily e11.65 7/10/23   Jayden Mullins MD   blood glucose monitor kit and supplies Dispense one meter that insurance will cover.  23   Jayden Mullins MD   blood glucose monitor strips Test 3x daily e11.65 23   Jayden Mullins MD   Lancets MISC 1 each by Does not apply route daily 23   Andre Shoemaker MD   insulin lispro protamine & lispro (HUMALOG MIX 75/25 KWIKPEN) (75-25) 100 UNIT per ML SUPN injection pen 16 units twice a day 23   Jayden Mullins MD   Insulin Pen Needle 32G X 4 MM MISC 1 each by Does not apply route 2 times daily 23   Jayden Mullins MD   prochlorperazine (COMPAZINE) 10 MG tablet Take 1 tablet by mouth every 6 hours as needed for nausea  Patient not taking: Reported on 2023 5/15/23   Debbi De La Garza MD   acetaminophen (TYLENOL) 500 MG tablet Take 1 tablet by mouth every 6 hours as needed for Pain    Debbi De La Garza MD   metFORMIN (GLUCOPHAGE) 500 MG tablet Take 1 tablet by mouth daily (with breakfast)  Patient not taking: Reported on 2023   Karon Ellington MD   ALBUTEROL IN Inhale 2 puffs into the lungs as needed  Patient not taking: Reported on 2023    Debbi De La Garza MD       Current medications:    Current

## 2023-10-13 NOTE — DISCHARGE INSTRUCTIONS
Discharge Instructions Mediport    DISCOMFORT   After your Mediport is placed, you may feel some discomfort at the incision sites where your catheter was tunneled under your skin. The pain should get better within 24 to 48 hours. You may take Tylenol or Ibuprofen as needed, be careful not to exceed the label usage on the bottle. It is permitted to use an ice pack for 10-minute intervals to reduce discomfort. Most people do not need prescription pain medication. Some bruising is expected for up to a week. Keep the port site dry. ACTIVITY   Resume activity gradually, avoid heavy lifting for the first 3 to 5 days. Wearing a seatbelt can put pressure along your incisional line. You can put a small pillow or towel folded between the strap and your body to lessen discomfort. After your body heals, you may return to normal daily activity. You can swim with your implanted port as long as there is no needle in place. Do not play contact sports, like football. Women should wear a bra during the day. BATHING   The glue/ skin adhesive is used instead of a bandage. Do not use an antibiotic ointment, it can break down the adhesive. You may remove any gauze and shower after 48 hours. Do not take a tub bath or scrub the area (allow the soap and water to run across the wound) for 14 days. Dry your skin by gently patting it down with a towel. The adhesive will peel off on its own in 5 to 10 days. CONTACT YOUR DOCTOR   if you notice signs of infection: redness, extreme pain and/or tenderness, puss with drainage, extreme warmth by the placement site, excessive bleeding, swelling, chills or fever (temperature above 100.6 degrees F). CARE   If your port is not used regularly, it will need to be flushed every 4 to 6 weeks so that it does not clot off or become blocked. This can be done at the clinic that uses your port for treatments and/or blood draws.   Remember to carry your identification card, key fob or

## 2023-10-13 NOTE — ANESTHESIA POSTPROCEDURE EVALUATION
Department of Anesthesiology  Postprocedure Note    Patient: Jesse Yan  MRN: [id number]  YOB: 1955  Date of evaluation: 10/13/2023      Procedure Summary     Date: 10/13/23 Room / Location: [facility] Vascular and Interventional Radiology; [facility] Special Procedure    Anesthesia Start: 6349 Anesthesia Stop: 0767    Procedures:       IR FLUOROSCOPY GUIDED CENTRAL VENOUS ACCESS DEVICE PLACEMENT      IR PORT PLACEMENT EQUAL OR GREATER THAN 5 YEARS      IR ULTRASOUND GUIDANCE VASCULAR ACCESS Diagnosis: Malignant neoplasm of anorectum ([address])    Scheduled Providers: Malik Escalona MD Responsible Provider: Estela Hernandez DO    Anesthesia Type: MAC ASA Status: 2          Anesthesia Type: No value filed.     Mu Phase I: Mu Score: 10    Mu Phase II:        Anesthesia Post Evaluation    Patient location during evaluation: bedside  Patient participation: complete - patient participated  Level of consciousness: awake and awake and alert  Airway patency: patent  Nausea & Vomiting: no nausea and no vomiting  Complications: no  Cardiovascular status: blood pressure returned to baseline and hemodynamically stable  Respiratory status: acceptable  Hydration status: euvolemic  Pain management: adequate

## 2023-10-13 NOTE — FLOWSHEET NOTE
Pt arrived to CT holding. Pt changed into gown. Pt hooked up to vitals sign machine. VSS. 20 g iv to L AC placed and stat k + drawn and taken to lab. Medical history, allergies, and home medications reviewed with patient. Consents reviewed with patient and signed. Pt resting on cart in stable condition. Pt states that she has been out of her OTC klor-con for multiple weeks. Electronically signed by Yas Cates RN on 10/13/2023 at 12:16 PM     Dr. Key Tovar in to speak with patient. Electronically signed by Yas Cates RN on 10/13/2023 at 12:27 PM         Pts  brought to bedside. Dr. Glenroy Herron notified patient ready to be seen and that  does have questions for him.  Electronically signed by Yas Cates RN on 10/13/2023 at 12:29 PM

## 2023-10-16 ENCOUNTER — POST-OP TELEPHONE (OUTPATIENT)
Dept: INTERVENTIONAL RADIOLOGY/VASCULAR | Age: 68
End: 2023-10-16

## 2023-10-16 NOTE — FLOWSHEET NOTE
RN call to patient to follow up post-op, as pt had mediport removal and insertion on 10/13/23 with Dr. Huey Winkler. Pt reports 0/10 pain to site. Pt confirms site and dressing is clean, dry, and intact. Pt states that her port has not yet been accessed and/or used. Pt has no questions or concerns at this time. Pt instructed to call back the radiology dept and request to speak with a nurse if any questions or concerns should develop at [phone].

## 2024-01-11 ENCOUNTER — TRANSCRIBE ORDERS (OUTPATIENT)
Dept: ADMINISTRATIVE | Age: 69
End: 2024-01-11

## 2024-01-11 DIAGNOSIS — C78.2 SECONDARY MALIGNANT NEOPLASM OF PLEURA (HCC): ICD-10-CM

## 2024-01-11 DIAGNOSIS — C21.8 MALIGNANT NEOPLASM OF ANORECTUM (HCC): Primary | ICD-10-CM

## 2024-01-23 ENCOUNTER — HOSPITAL ENCOUNTER (OUTPATIENT)
Dept: CT IMAGING | Age: 69
Discharge: HOME | End: 2024-01-25
Attending: INTERNAL MEDICINE
Payer: MEDICARE

## 2024-01-23 DIAGNOSIS — C21.8 MALIGNANT NEOPLASM OF ANORECTUM (HCC): ICD-10-CM

## 2024-01-23 DIAGNOSIS — C78.2 SECONDARY MALIGNANT NEOPLASM OF PLEURA (HCC): ICD-10-CM

## 2024-01-23 LAB
PERFORMED ON: NORMAL
POC CREATININE: 0.7 MG/DL (ref 0.6–1.2)
POC SAMPLE TYPE: NORMAL

## 2024-01-23 PROCEDURE — 74177 CT ABD & PELVIS W/CONTRAST: CPT

## 2024-01-23 PROCEDURE — 6360000004 HC RX CONTRAST MEDICATION: Performed by: INTERNAL MEDICINE

## 2024-01-23 PROCEDURE — 71260 CT THORAX DX C+: CPT

## 2024-01-23 RX ADMIN — IOPAMIDOL 20 ML: 612 INJECTION, SOLUTION INTRAVENOUS at 11:07

## 2024-01-23 RX ADMIN — IOPAMIDOL 75 ML: 612 INJECTION, SOLUTION INTRAVENOUS at 11:07

## 2024-05-20 ENCOUNTER — TRANSCRIBE ORDERS (OUTPATIENT)
Dept: ADMINISTRATIVE | Age: 69
End: 2024-05-20

## 2024-05-20 DIAGNOSIS — C21.8: ICD-10-CM

## 2024-05-20 DIAGNOSIS — C78.7 SECONDARY MALIGNANT NEOPLASM OF LIVER (HCC): Primary | ICD-10-CM

## 2024-06-10 ENCOUNTER — HOSPITAL ENCOUNTER (OUTPATIENT)
Dept: CT IMAGING | Age: 69
Discharge: HOME | End: 2024-06-12
Attending: INTERNAL MEDICINE
Payer: MEDICARE

## 2024-06-10 DIAGNOSIS — C78.7 SECONDARY MALIGNANT NEOPLASM OF LIVER (HCC): ICD-10-CM

## 2024-06-10 DIAGNOSIS — C21.8: ICD-10-CM

## 2024-06-10 PROCEDURE — 71260 CT THORAX DX C+: CPT

## 2024-06-10 PROCEDURE — 74177 CT ABD & PELVIS W/CONTRAST: CPT

## 2024-06-10 PROCEDURE — 6360000004 HC RX CONTRAST MEDICATION: Performed by: INTERNAL MEDICINE

## 2024-06-10 RX ADMIN — IOPAMIDOL 18 ML: 755 INJECTION, SOLUTION INTRAVENOUS at 13:12

## 2024-06-10 RX ADMIN — IOPAMIDOL 75 ML: 755 INJECTION, SOLUTION INTRAVENOUS at 13:12

## 2024-10-14 ENCOUNTER — HOSPITAL ENCOUNTER (OUTPATIENT)
Dept: CT IMAGING | Age: 69
Discharge: HOME | End: 2024-10-16
Payer: MEDICARE

## 2024-10-14 DIAGNOSIS — C78.01 SECONDARY CARCINOMA OF LUNG, RIGHT (HCC): ICD-10-CM

## 2024-10-14 DIAGNOSIS — C21.8 CANCER OF ANORECTUM (HCC): ICD-10-CM

## 2024-10-14 DIAGNOSIS — C78.7 SECONDARY MALIGNANT NEOPLASM OF LIVER (HCC): ICD-10-CM

## 2024-10-14 PROCEDURE — 6360000004 HC RX CONTRAST MEDICATION: Performed by: NURSE PRACTITIONER

## 2024-10-14 PROCEDURE — 74177 CT ABD & PELVIS W/CONTRAST: CPT

## 2024-10-14 PROCEDURE — 71260 CT THORAX DX C+: CPT

## 2024-10-14 RX ORDER — IOPAMIDOL 755 MG/ML
75 INJECTION, SOLUTION INTRAVASCULAR
Status: COMPLETED | OUTPATIENT
Start: 2024-10-14 | End: 2024-10-14

## 2024-10-14 RX ORDER — IOPAMIDOL 755 MG/ML
18 INJECTION, SOLUTION INTRAVASCULAR
Status: COMPLETED | OUTPATIENT
Start: 2024-10-14 | End: 2024-10-14

## 2024-10-14 RX ADMIN — IOPAMIDOL 18 ML: 755 INJECTION, SOLUTION INTRAVENOUS at 10:14

## 2024-10-14 RX ADMIN — IOPAMIDOL 75 ML: 755 INJECTION, SOLUTION INTRAVENOUS at 10:11

## 2024-12-27 DIAGNOSIS — E11.69 TYPE 2 DIABETES MELLITUS WITH OTHER SPECIFIED COMPLICATION, WITHOUT LONG-TERM CURRENT USE OF INSULIN (HCC): ICD-10-CM

## 2024-12-27 RX ORDER — INSULIN ASPART 100 [IU]/ML
INJECTION, SUSPENSION SUBCUTANEOUS
Refills: 3 | OUTPATIENT
Start: 2024-12-27

## 2025-04-21 ENCOUNTER — HOSPITAL ENCOUNTER (OUTPATIENT)
Dept: CT IMAGING | Age: 70
Discharge: HOME | End: 2025-04-23
Attending: INTERNAL MEDICINE
Payer: MEDICARE

## 2025-04-21 DIAGNOSIS — C78.01 SECONDARY MALIGNANT NEOPLASM OF RIGHT LUNG (HCC): ICD-10-CM

## 2025-04-21 DIAGNOSIS — C78.02 SECONDARY MALIGNANT NEOPLASM OF LEFT LUNG (HCC): ICD-10-CM

## 2025-04-21 DIAGNOSIS — C21.8 MALIGNANT NEOPLASM OF OVERLAPPING SITES OF RECTUM, ANUS AND ANAL CANAL (HCC): ICD-10-CM

## 2025-04-21 PROCEDURE — 6360000004 HC RX CONTRAST MEDICATION: Performed by: INTERNAL MEDICINE

## 2025-04-21 PROCEDURE — 71260 CT THORAX DX C+: CPT

## 2025-04-21 PROCEDURE — 74177 CT ABD & PELVIS W/CONTRAST: CPT

## 2025-04-21 RX ORDER — IOPAMIDOL 755 MG/ML
75 INJECTION, SOLUTION INTRAVASCULAR
Status: COMPLETED | OUTPATIENT
Start: 2025-04-21 | End: 2025-04-21

## 2025-04-21 RX ORDER — IOPAMIDOL 755 MG/ML
18 INJECTION, SOLUTION INTRAVASCULAR
Status: COMPLETED | OUTPATIENT
Start: 2025-04-21 | End: 2025-04-21

## 2025-04-21 RX ADMIN — IOPAMIDOL 75 ML: 755 INJECTION, SOLUTION INTRAVENOUS at 09:39

## 2025-04-21 RX ADMIN — IOPAMIDOL 18 ML: 755 INJECTION, SOLUTION INTRAVENOUS at 09:44

## 2025-05-07 ENCOUNTER — HOSPITAL ENCOUNTER (OUTPATIENT)
Dept: CT IMAGING | Age: 70
Discharge: HOME | End: 2025-05-09
Payer: MEDICARE

## 2025-05-07 DIAGNOSIS — C21.8 MALIGNANT NEOPLASM OF ANORECTUM (HCC): ICD-10-CM

## 2025-05-07 PROCEDURE — 3430000000 HC RX DIAGNOSTIC RADIOPHARMACEUTICAL: Performed by: INTERNAL MEDICINE

## 2025-05-07 PROCEDURE — A9609 HC RX DIAGNOSTIC RADIOPHARMACEUTICAL: HCPCS | Performed by: INTERNAL MEDICINE

## 2025-05-07 PROCEDURE — 78815 PET IMAGE W/CT SKULL-THIGH: CPT

## 2025-05-07 RX ORDER — FLUDEOXYGLUCOSE F 18 200 MCI/ML
12.3 INJECTION, SOLUTION INTRAVENOUS
Status: COMPLETED | OUTPATIENT
Start: 2025-05-07 | End: 2025-05-07

## 2025-05-07 RX ADMIN — FLUDEOXYGLUCOSE F 18 12.3 MILLICURIE: 200 INJECTION, SOLUTION INTRAVENOUS at 09:20

## 2025-08-29 ENCOUNTER — TRANSCRIBE ORDERS (OUTPATIENT)
Dept: ADMINISTRATIVE | Age: 70
End: 2025-08-29

## 2025-08-29 DIAGNOSIS — C78.01 SECONDARY MALIGNANT NEOPLASM OF RIGHT LUNG (HCC): ICD-10-CM

## 2025-08-29 DIAGNOSIS — C78.7 SECONDARY MALIGNANT NEOPLASM OF LIVER (HCC): ICD-10-CM

## 2025-08-29 DIAGNOSIS — C21.8 MALIGNANT NEOPLASM OF OVERLAPPING SITES OF RECTUM, ANUS AND ANAL CANAL (HCC): Primary | ICD-10-CM

## (undated) DEVICE — TOWEL,OR,DSP,ST,BLUE,STD,4/PK,20PK/CS: Brand: MEDLINE

## (undated) DEVICE — APPLICATOR MEDICATED 10.5 CC SOLUTION HI LT ORNG CHLORAPREP

## (undated) DEVICE — ELECTRODE PT RET AD L9FT HI MOIST COND ADH HYDRGEL CORDED

## (undated) DEVICE — Device: Brand: ENDO SMARTCAP

## (undated) DEVICE — JELLY,LUBE,STERILE,FLIP TOP,TUBE,2-OZ: Brand: MEDLINE

## (undated) DEVICE — GLOVE ORANGE PI 7 1/2   MSG9075

## (undated) DEVICE — COVER LT HNDL BLU PLAS

## (undated) DEVICE — MARKER SURG SKIN GENTIAN VLT REG TIP W/ 6IN RUL

## (undated) DEVICE — BRUSH ENDO CLN L90.5IN SHTH DIA1.7MM BRIST DIA5-7MM 2-6MM

## (undated) DEVICE — BLADE,CARBON-STEEL,11,STRL,DISPOSABLE,TB: Brand: MEDLINE

## (undated) DEVICE — SUTURE VCRL SZ 3-0 L27IN ABSRB UD L26MM SH 1/2 CIR J416H

## (undated) DEVICE — NEEDLE HYPO 25GA L1.5IN BLU POLYPR HUB S STL REG BVL STR

## (undated) DEVICE — STRIP,CLOSURE,WOUND,MEDI-STRIP,1/2X4: Brand: MEDLINE

## (undated) DEVICE — LABEL MED MINI W/ MARKER

## (undated) DEVICE — SPONGE GZ W4XL4IN COT 12 PLY TYP VII WVN C FLD DSGN STERILE

## (undated) DEVICE — SINGLE PORT MANIFOLD: Brand: NEPTUNE 2

## (undated) DEVICE — TUBE SET 96 MM 64 MM H2O PERISTALTIC STD AUX CHANNEL

## (undated) DEVICE — PACK,LAPAROTOMY,NO GOWNS: Brand: MEDLINE

## (undated) DEVICE — SUTURE VCRL SZ 2-0 L27IN ABSRB UD L26MM CT-2 1/2 CIR J269H

## (undated) DEVICE — GOWN,AURORA,NONRNF,XL,30/CS: Brand: MEDLINE

## (undated) DEVICE — BANDAGE ADH W0.75XL3IN UNIV WVN FAB NAT GEN USE STRP N ADH

## (undated) DEVICE — NEPTUNE E-SEP SMOKE EVACUATION PENCIL, COATED, 70MM BLADE, PUSH BUTTON SWITCH: Brand: NEPTUNE E-SEP

## (undated) DEVICE — C-ARM: Brand: UNBRANDED

## (undated) DEVICE — RADIFOCUS GLIDEWIRE: Brand: GLIDEWIRE

## (undated) DEVICE — GOWN,AURORA,NONREINFORCED,LARGE: Brand: MEDLINE

## (undated) DEVICE — SUTURE MCRYL SZ 4-0 L27IN ABSRB UD L19MM PS-2 1/2 CIR PRIM Y426H

## (undated) DEVICE — ADAPTER FLSH PMP FLD MGMT GI IRRIG OFP 2 DISPOSABLE

## (undated) DEVICE — Z INACTIVE USE 2863041 SPONGE GZ W4XL4IN 100% COT 16 PLY RADPQ HIGHLY ABSRB

## (undated) DEVICE — BANDAGE ADH W2XL4IN NITRL FAB STRP CURAD

## (undated) DEVICE — RESTRAINT EXTREMITY LIMB HOLDER SFT FOAM SINGLE STRP DISP

## (undated) DEVICE — GLOVE ORANGE PI 8   MSG9080

## (undated) DEVICE — SYRINGE MED 10ML LUERLOCK TIP W/O SFTY DISP

## (undated) DEVICE — COUNTER NDL 40 COUNT HLD 70 FOAM BLK ADH W/ MAG